# Patient Record
Sex: MALE | Race: ASIAN | NOT HISPANIC OR LATINO
[De-identification: names, ages, dates, MRNs, and addresses within clinical notes are randomized per-mention and may not be internally consistent; named-entity substitution may affect disease eponyms.]

---

## 2017-01-25 ENCOUNTER — APPOINTMENT (OUTPATIENT)
Dept: INTERNAL MEDICINE | Facility: CLINIC | Age: 47
End: 2017-01-25

## 2017-02-01 ENCOUNTER — CHART COPY (OUTPATIENT)
Age: 47
End: 2017-02-01

## 2017-04-18 ENCOUNTER — APPOINTMENT (OUTPATIENT)
Dept: INTERNAL MEDICINE | Facility: CLINIC | Age: 47
End: 2017-04-18

## 2017-04-18 VITALS
SYSTOLIC BLOOD PRESSURE: 120 MMHG | TEMPERATURE: 98.7 F | HEIGHT: 74 IN | WEIGHT: 184 LBS | DIASTOLIC BLOOD PRESSURE: 80 MMHG | BODY MASS INDEX: 23.61 KG/M2

## 2017-07-28 ENCOUNTER — LABORATORY RESULT (OUTPATIENT)
Age: 47
End: 2017-07-28

## 2017-07-28 ENCOUNTER — APPOINTMENT (OUTPATIENT)
Dept: INTERNAL MEDICINE | Facility: CLINIC | Age: 47
End: 2017-07-28
Payer: COMMERCIAL

## 2017-07-28 PROCEDURE — 43239 EGD BIOPSY SINGLE/MULTIPLE: CPT

## 2017-08-09 ENCOUNTER — LABORATORY RESULT (OUTPATIENT)
Age: 47
End: 2017-08-09

## 2017-08-09 ENCOUNTER — APPOINTMENT (OUTPATIENT)
Dept: INTERNAL MEDICINE | Facility: CLINIC | Age: 47
End: 2017-08-09
Payer: COMMERCIAL

## 2017-08-09 PROCEDURE — 36415 COLL VENOUS BLD VENIPUNCTURE: CPT

## 2017-08-10 LAB
ALBUMIN SERPL ELPH-MCNC: 4.8 G/DL
ALP BLD-CCNC: 83 U/L
ALT SERPL-CCNC: 41 U/L
ANION GAP SERPL CALC-SCNC: 15 MMOL/L
AST SERPL-CCNC: 18 U/L
BASOPHILS # BLD AUTO: 0.02 K/UL
BASOPHILS NFR BLD AUTO: 0.3 %
BILIRUB SERPL-MCNC: 0.8 MG/DL
BUN SERPL-MCNC: 13 MG/DL
CALCIUM SERPL-MCNC: 10.2 MG/DL
CHLORIDE SERPL-SCNC: 102 MMOL/L
CHOLEST SERPL-MCNC: 235 MG/DL
CHOLEST/HDLC SERPL: 5.9 RATIO
CO2 SERPL-SCNC: 24 MMOL/L
CREAT SERPL-MCNC: 1.12 MG/DL
EOSINOPHIL # BLD AUTO: 0.1 K/UL
EOSINOPHIL NFR BLD AUTO: 1.3 %
GLUCOSE SERPL-MCNC: 123 MG/DL
HBA1C MFR BLD HPLC: 5.6 %
HCT VFR BLD CALC: 47.7 %
HDLC SERPL-MCNC: 40 MG/DL
HGB BLD-MCNC: 16.4 G/DL
IMM GRANULOCYTES NFR BLD AUTO: 0.4 %
LDLC SERPL CALC-MCNC: 158 MG/DL
LDLC SERPL DIRECT ASSAY-MCNC: 169 MG/DL
LYMPHOCYTES # BLD AUTO: 2.48 K/UL
LYMPHOCYTES NFR BLD AUTO: 31.4 %
MAN DIFF?: NORMAL
MCHC RBC-ENTMCNC: 31.8 PG
MCHC RBC-ENTMCNC: 34.4 GM/DL
MCV RBC AUTO: 92.6 FL
MONOCYTES # BLD AUTO: 0.46 K/UL
MONOCYTES NFR BLD AUTO: 5.8 %
NEUTROPHILS # BLD AUTO: 4.8 K/UL
NEUTROPHILS NFR BLD AUTO: 60.8 %
PLATELET # BLD AUTO: 345 K/UL
POTASSIUM SERPL-SCNC: 4.5 MMOL/L
PROT SERPL-MCNC: 8.1 G/DL
PSA SERPL-MCNC: 0.45 NG/ML
RBC # BLD: 5.15 M/UL
RBC # FLD: 12.9 %
SAVE SPECIMEN: NORMAL
SODIUM SERPL-SCNC: 141 MMOL/L
T3 SERPL-MCNC: 122 NG/DL
T3RU NFR SERPL: 1 INDEX
T4 FREE SERPL-MCNC: 1.4 NG/DL
TRIGL SERPL-MCNC: 185 MG/DL
TSH SERPL-ACNC: 1.4 UIU/ML
WBC # FLD AUTO: 7.89 K/UL

## 2017-08-16 ENCOUNTER — APPOINTMENT (OUTPATIENT)
Dept: INTERNAL MEDICINE | Facility: CLINIC | Age: 47
End: 2017-08-16
Payer: COMMERCIAL

## 2017-08-16 VITALS
SYSTOLIC BLOOD PRESSURE: 106 MMHG | BODY MASS INDEX: 21.84 KG/M2 | DIASTOLIC BLOOD PRESSURE: 78 MMHG | HEIGHT: 74.5 IN | WEIGHT: 172 LBS

## 2017-08-16 DIAGNOSIS — Z00.00 ENCOUNTER FOR GENERAL ADULT MEDICAL EXAMINATION W/OUT ABNORMAL FINDINGS: ICD-10-CM

## 2017-08-16 DIAGNOSIS — R73.01 IMPAIRED FASTING GLUCOSE: ICD-10-CM

## 2017-08-16 PROCEDURE — 93000 ELECTROCARDIOGRAM COMPLETE: CPT

## 2017-08-16 PROCEDURE — 99396 PREV VISIT EST AGE 40-64: CPT | Mod: 25

## 2017-08-16 PROCEDURE — 94010 BREATHING CAPACITY TEST: CPT

## 2017-08-16 PROCEDURE — 99214 OFFICE O/P EST MOD 30 MIN: CPT | Mod: 25

## 2017-08-18 ENCOUNTER — FORM ENCOUNTER (OUTPATIENT)
Age: 47
End: 2017-08-18

## 2017-08-19 ENCOUNTER — APPOINTMENT (OUTPATIENT)
Dept: RADIOLOGY | Facility: HOSPITAL | Age: 47
End: 2017-08-19
Payer: COMMERCIAL

## 2017-08-19 ENCOUNTER — OUTPATIENT (OUTPATIENT)
Dept: OUTPATIENT SERVICES | Facility: HOSPITAL | Age: 47
LOS: 1 days | End: 2017-08-19
Payer: COMMERCIAL

## 2017-08-19 PROCEDURE — 71020: CPT | Mod: 26

## 2017-08-19 PROCEDURE — 71046 X-RAY EXAM CHEST 2 VIEWS: CPT

## 2017-11-27 ENCOUNTER — APPOINTMENT (OUTPATIENT)
Dept: INTERNAL MEDICINE | Facility: CLINIC | Age: 47
End: 2017-11-27
Payer: COMMERCIAL

## 2017-11-27 VITALS
WEIGHT: 175 LBS | HEIGHT: 74.5 IN | BODY MASS INDEX: 22.22 KG/M2 | TEMPERATURE: 98.7 F | SYSTOLIC BLOOD PRESSURE: 120 MMHG | DIASTOLIC BLOOD PRESSURE: 80 MMHG

## 2017-11-27 LAB — S PYO AG SPEC QL IA: NEGATIVE

## 2017-11-27 PROCEDURE — 87880 STREP A ASSAY W/OPTIC: CPT | Mod: QW

## 2017-11-27 PROCEDURE — 99214 OFFICE O/P EST MOD 30 MIN: CPT | Mod: 25

## 2017-11-29 LAB — BACTERIA THROAT CULT: NORMAL

## 2018-04-07 ENCOUNTER — INPATIENT (INPATIENT)
Facility: HOSPITAL | Age: 48
LOS: 2 days | Discharge: HOME | DRG: 390 | End: 2018-04-10
Attending: SURGERY | Admitting: SURGERY
Payer: COMMERCIAL

## 2018-04-07 DIAGNOSIS — K56.609 UNSPECIFIED INTESTINAL OBSTRUCTION, UNSPECIFIED AS TO PARTIAL VERSUS COMPLETE OBSTRUCTION: ICD-10-CM

## 2018-04-07 DIAGNOSIS — K56.50 INTESTINAL ADHESIONS [BANDS], UNSPECIFIED AS TO PARTIAL VERSUS COMPLETE OBSTRUCTION: ICD-10-CM

## 2018-04-07 PROCEDURE — 71045 X-RAY EXAM CHEST 1 VIEW: CPT | Mod: 26

## 2018-04-07 PROCEDURE — 74018 RADEX ABDOMEN 1 VIEW: CPT | Mod: 26

## 2018-04-08 DIAGNOSIS — K56.609 UNSPECIFIED INTESTINAL OBSTRUCTION, UNSPECIFIED AS TO PARTIAL VERSUS COMPLETE OBSTRUCTION: ICD-10-CM

## 2018-04-08 PROCEDURE — 71045 X-RAY EXAM CHEST 1 VIEW: CPT | Mod: 26

## 2018-04-08 PROCEDURE — 74177 CT ABD & PELVIS W/CONTRAST: CPT | Mod: 26

## 2018-04-08 PROCEDURE — 93010 ELECTROCARDIOGRAM REPORT: CPT

## 2018-04-08 PROCEDURE — 99223 1ST HOSP IP/OBS HIGH 75: CPT

## 2018-04-09 PROCEDURE — 99232 SBSQ HOSP IP/OBS MODERATE 35: CPT

## 2018-04-10 PROCEDURE — 80076 HEPATIC FUNCTION PANEL: CPT

## 2018-04-10 PROCEDURE — 83605 ASSAY OF LACTIC ACID: CPT

## 2018-04-10 PROCEDURE — 81003 URINALYSIS AUTO W/O SCOPE: CPT

## 2018-04-10 PROCEDURE — 85730 THROMBOPLASTIN TIME PARTIAL: CPT

## 2018-04-10 PROCEDURE — 71045 X-RAY EXAM CHEST 1 VIEW: CPT

## 2018-04-10 PROCEDURE — 86850 RBC ANTIBODY SCREEN: CPT

## 2018-04-10 PROCEDURE — 80048 BASIC METABOLIC PNL TOTAL CA: CPT

## 2018-04-10 PROCEDURE — 99285 EMERGENCY DEPT VISIT HI MDM: CPT | Mod: 25

## 2018-04-10 PROCEDURE — 99231 SBSQ HOSP IP/OBS SF/LOW 25: CPT

## 2018-04-10 PROCEDURE — 85025 COMPLETE CBC W/AUTO DIFF WBC: CPT

## 2018-04-10 PROCEDURE — 96374 THER/PROPH/DIAG INJ IV PUSH: CPT | Mod: XU

## 2018-04-10 PROCEDURE — 96376 TX/PRO/DX INJ SAME DRUG ADON: CPT

## 2018-04-10 PROCEDURE — 74018 RADEX ABDOMEN 1 VIEW: CPT

## 2018-04-10 PROCEDURE — 82150 ASSAY OF AMYLASE: CPT

## 2018-04-10 PROCEDURE — 80053 COMPREHEN METABOLIC PANEL: CPT

## 2018-04-10 PROCEDURE — 74177 CT ABD & PELVIS W/CONTRAST: CPT

## 2018-04-10 PROCEDURE — 85610 PROTHROMBIN TIME: CPT

## 2018-04-10 PROCEDURE — 86900 BLOOD TYPING SEROLOGIC ABO: CPT

## 2018-04-10 PROCEDURE — 96361 HYDRATE IV INFUSION ADD-ON: CPT

## 2018-04-10 PROCEDURE — 83690 ASSAY OF LIPASE: CPT

## 2018-04-10 PROCEDURE — 85027 COMPLETE CBC AUTOMATED: CPT

## 2018-04-10 PROCEDURE — 93005 ELECTROCARDIOGRAM TRACING: CPT

## 2018-04-10 PROCEDURE — 96375 TX/PRO/DX INJ NEW DRUG ADDON: CPT

## 2018-05-02 ENCOUNTER — APPOINTMENT (OUTPATIENT)
Dept: INTERNAL MEDICINE | Facility: CLINIC | Age: 48
End: 2018-05-02
Payer: COMMERCIAL

## 2018-05-02 VITALS
SYSTOLIC BLOOD PRESSURE: 100 MMHG | HEIGHT: 74.5 IN | BODY MASS INDEX: 22.22 KG/M2 | DIASTOLIC BLOOD PRESSURE: 80 MMHG | WEIGHT: 175 LBS

## 2018-05-02 DIAGNOSIS — E78.00 PURE HYPERCHOLESTEROLEMIA, UNSPECIFIED: ICD-10-CM

## 2018-05-02 DIAGNOSIS — R19.7 DIARRHEA, UNSPECIFIED: ICD-10-CM

## 2018-05-02 DIAGNOSIS — R10.9 UNSPECIFIED ABDOMINAL PAIN: ICD-10-CM

## 2018-05-02 DIAGNOSIS — R94.5 ABNORMAL RESULTS OF LIVER FUNCTION STUDIES: ICD-10-CM

## 2018-05-02 DIAGNOSIS — Z87.19 PERSONAL HISTORY OF OTHER DISEASES OF THE DIGESTIVE SYSTEM: ICD-10-CM

## 2018-05-02 PROCEDURE — 99214 OFFICE O/P EST MOD 30 MIN: CPT | Mod: 25

## 2018-05-02 PROCEDURE — 36415 COLL VENOUS BLD VENIPUNCTURE: CPT

## 2018-05-03 ENCOUNTER — LABORATORY RESULT (OUTPATIENT)
Age: 48
End: 2018-05-03

## 2018-05-04 LAB
ALBUMIN SERPL ELPH-MCNC: 5.1 G/DL
ALP BLD-CCNC: 77 U/L
ALT SERPL-CCNC: 68 U/L
ANION GAP SERPL CALC-SCNC: 16 MMOL/L
AST SERPL-CCNC: 26 U/L
BASOPHILS # BLD AUTO: 0.02 K/UL
BASOPHILS NFR BLD AUTO: 0.3 %
BILIRUB SERPL-MCNC: 0.7 MG/DL
BUN SERPL-MCNC: 13 MG/DL
CALCIUM SERPL-MCNC: 9.5 MG/DL
CHLORIDE SERPL-SCNC: 99 MMOL/L
CO2 SERPL-SCNC: 22 MMOL/L
CREAT SERPL-MCNC: 0.91 MG/DL
EOSINOPHIL # BLD AUTO: 0.16 K/UL
EOSINOPHIL NFR BLD AUTO: 2 %
GLUCOSE SERPL-MCNC: 96 MG/DL
HCT VFR BLD CALC: 46.8 %
HGB BLD-MCNC: 15.6 G/DL
IMM GRANULOCYTES NFR BLD AUTO: 0.1 %
LYMPHOCYTES # BLD AUTO: 2.71 K/UL
LYMPHOCYTES NFR BLD AUTO: 34.6 %
MAN DIFF?: NORMAL
MCHC RBC-ENTMCNC: 31.3 PG
MCHC RBC-ENTMCNC: 33.3 GM/DL
MCV RBC AUTO: 94 FL
MONOCYTES # BLD AUTO: 0.52 K/UL
MONOCYTES NFR BLD AUTO: 6.6 %
NEUTROPHILS # BLD AUTO: 4.42 K/UL
NEUTROPHILS NFR BLD AUTO: 56.4 %
PLATELET # BLD AUTO: 334 K/UL
POTASSIUM SERPL-SCNC: 4.4 MMOL/L
PROT SERPL-MCNC: 8.1 G/DL
RBC # BLD: 4.98 M/UL
RBC # FLD: 12.8 %
SODIUM SERPL-SCNC: 137 MMOL/L
WBC # FLD AUTO: 7.84 K/UL

## 2018-06-21 ENCOUNTER — APPOINTMENT (OUTPATIENT)
Dept: INTERNAL MEDICINE | Facility: CLINIC | Age: 48
End: 2018-06-21

## 2018-06-21 VITALS
DIASTOLIC BLOOD PRESSURE: 80 MMHG | BODY MASS INDEX: 23.09 KG/M2 | HEIGHT: 73.5 IN | WEIGHT: 178 LBS | TEMPERATURE: 98.3 F | SYSTOLIC BLOOD PRESSURE: 110 MMHG

## 2018-06-22 ENCOUNTER — TRANSCRIPTION ENCOUNTER (OUTPATIENT)
Age: 48
End: 2018-06-22

## 2018-06-23 ENCOUNTER — APPOINTMENT (OUTPATIENT)
Dept: INTERNAL MEDICINE | Facility: CLINIC | Age: 48
End: 2018-06-23

## 2018-07-14 ENCOUNTER — RESULT REVIEW (OUTPATIENT)
Age: 48
End: 2018-07-14

## 2018-08-17 ENCOUNTER — APPOINTMENT (OUTPATIENT)
Dept: INTERNAL MEDICINE | Facility: CLINIC | Age: 48
End: 2018-08-17

## 2018-08-24 ENCOUNTER — APPOINTMENT (OUTPATIENT)
Dept: INTERNAL MEDICINE | Facility: CLINIC | Age: 48
End: 2018-08-24

## 2018-10-28 ENCOUNTER — TRANSCRIPTION ENCOUNTER (OUTPATIENT)
Age: 48
End: 2018-10-28

## 2018-12-08 ENCOUNTER — APPOINTMENT (OUTPATIENT)
Dept: CT IMAGING | Facility: CLINIC | Age: 48
End: 2018-12-08

## 2019-04-25 ENCOUNTER — TRANSCRIPTION ENCOUNTER (OUTPATIENT)
Age: 49
End: 2019-04-25

## 2019-11-22 ENCOUNTER — TRANSCRIPTION ENCOUNTER (OUTPATIENT)
Age: 49
End: 2019-11-22

## 2020-09-05 ENCOUNTER — TRANSCRIPTION ENCOUNTER (OUTPATIENT)
Age: 50
End: 2020-09-05

## 2020-10-22 ENCOUNTER — EMERGENCY (EMERGENCY)
Facility: HOSPITAL | Age: 50
LOS: 1 days | Discharge: HOME | End: 2020-10-22
Attending: EMERGENCY MEDICINE | Admitting: EMERGENCY MEDICINE
Payer: COMMERCIAL

## 2020-10-22 VITALS
TEMPERATURE: 97 F | HEART RATE: 60 BPM | OXYGEN SATURATION: 97 % | DIASTOLIC BLOOD PRESSURE: 84 MMHG | RESPIRATION RATE: 18 BRPM | SYSTOLIC BLOOD PRESSURE: 145 MMHG

## 2020-10-22 VITALS
TEMPERATURE: 98 F | HEART RATE: 71 BPM | RESPIRATION RATE: 18 BRPM | OXYGEN SATURATION: 96 % | WEIGHT: 167.99 LBS | HEIGHT: 74 IN | DIASTOLIC BLOOD PRESSURE: 103 MMHG | SYSTOLIC BLOOD PRESSURE: 149 MMHG

## 2020-10-22 DIAGNOSIS — R51.9 HEADACHE, UNSPECIFIED: ICD-10-CM

## 2020-10-22 LAB
ALBUMIN SERPL ELPH-MCNC: 4.3 G/DL — SIGNIFICANT CHANGE UP (ref 3.3–5)
ALP SERPL-CCNC: 82 U/L — SIGNIFICANT CHANGE UP (ref 40–120)
ALT FLD-CCNC: 76 U/L — HIGH (ref 10–45)
ANION GAP SERPL CALC-SCNC: 10 MMOL/L — SIGNIFICANT CHANGE UP (ref 5–17)
AST SERPL-CCNC: 19 U/L — SIGNIFICANT CHANGE UP (ref 10–40)
BASOPHILS # BLD AUTO: 0.06 K/UL — SIGNIFICANT CHANGE UP (ref 0–0.2)
BASOPHILS NFR BLD AUTO: 0.6 % — SIGNIFICANT CHANGE UP (ref 0–2)
BILIRUB SERPL-MCNC: 1 MG/DL — SIGNIFICANT CHANGE UP (ref 0.2–1.2)
BUN SERPL-MCNC: 13 MG/DL — SIGNIFICANT CHANGE UP (ref 7–23)
CALCIUM SERPL-MCNC: 9 MG/DL — SIGNIFICANT CHANGE UP (ref 8.4–10.5)
CHLORIDE SERPL-SCNC: 104 MMOL/L — SIGNIFICANT CHANGE UP (ref 96–108)
CO2 SERPL-SCNC: 26 MMOL/L — SIGNIFICANT CHANGE UP (ref 22–31)
CREAT SERPL-MCNC: 1.04 MG/DL — SIGNIFICANT CHANGE UP (ref 0.5–1.3)
EOSINOPHIL # BLD AUTO: 0.06 K/UL — SIGNIFICANT CHANGE UP (ref 0–0.5)
EOSINOPHIL NFR BLD AUTO: 0.6 % — SIGNIFICANT CHANGE UP (ref 0–6)
GLUCOSE SERPL-MCNC: 96 MG/DL — SIGNIFICANT CHANGE UP (ref 70–99)
HCT VFR BLD CALC: 45.5 % — SIGNIFICANT CHANGE UP (ref 39–50)
HGB BLD-MCNC: 15.4 G/DL — SIGNIFICANT CHANGE UP (ref 13–17)
IMM GRANULOCYTES NFR BLD AUTO: 0.5 % — SIGNIFICANT CHANGE UP (ref 0–1.5)
LYMPHOCYTES # BLD AUTO: 28.7 % — SIGNIFICANT CHANGE UP (ref 13–44)
LYMPHOCYTES # BLD AUTO: 3.13 K/UL — SIGNIFICANT CHANGE UP (ref 1–3.3)
MCHC RBC-ENTMCNC: 31.1 PG — SIGNIFICANT CHANGE UP (ref 27–34)
MCHC RBC-ENTMCNC: 33.8 GM/DL — SIGNIFICANT CHANGE UP (ref 32–36)
MCV RBC AUTO: 91.9 FL — SIGNIFICANT CHANGE UP (ref 80–100)
MONOCYTES # BLD AUTO: 0.69 K/UL — SIGNIFICANT CHANGE UP (ref 0–0.9)
MONOCYTES NFR BLD AUTO: 6.3 % — SIGNIFICANT CHANGE UP (ref 2–14)
NEUTROPHILS # BLD AUTO: 6.9 K/UL — SIGNIFICANT CHANGE UP (ref 1.8–7.4)
NEUTROPHILS NFR BLD AUTO: 63.3 % — SIGNIFICANT CHANGE UP (ref 43–77)
NRBC # BLD: 0 /100 WBCS — SIGNIFICANT CHANGE UP (ref 0–0)
PLATELET # BLD AUTO: 316 K/UL — SIGNIFICANT CHANGE UP (ref 150–400)
POTASSIUM SERPL-MCNC: 3.8 MMOL/L — SIGNIFICANT CHANGE UP (ref 3.5–5.3)
POTASSIUM SERPL-SCNC: 3.8 MMOL/L — SIGNIFICANT CHANGE UP (ref 3.5–5.3)
PROT SERPL-MCNC: 7.9 G/DL — SIGNIFICANT CHANGE UP (ref 6–8.3)
RBC # BLD: 4.95 M/UL — SIGNIFICANT CHANGE UP (ref 4.2–5.8)
RBC # FLD: 12.1 % — SIGNIFICANT CHANGE UP (ref 10.3–14.5)
SARS-COV-2 RNA SPEC QL NAA+PROBE: SIGNIFICANT CHANGE UP
SODIUM SERPL-SCNC: 140 MMOL/L — SIGNIFICANT CHANGE UP (ref 135–145)
WBC # BLD: 10.89 K/UL — HIGH (ref 3.8–10.5)
WBC # FLD AUTO: 10.89 K/UL — HIGH (ref 3.8–10.5)

## 2020-10-22 PROCEDURE — 96361 HYDRATE IV INFUSION ADD-ON: CPT

## 2020-10-22 PROCEDURE — 36415 COLL VENOUS BLD VENIPUNCTURE: CPT

## 2020-10-22 PROCEDURE — 70450 CT HEAD/BRAIN W/O DYE: CPT | Mod: 26

## 2020-10-22 PROCEDURE — 96374 THER/PROPH/DIAG INJ IV PUSH: CPT

## 2020-10-22 PROCEDURE — 70450 CT HEAD/BRAIN W/O DYE: CPT

## 2020-10-22 PROCEDURE — 87635 SARS-COV-2 COVID-19 AMP PRB: CPT

## 2020-10-22 PROCEDURE — 80053 COMPREHEN METABOLIC PANEL: CPT

## 2020-10-22 PROCEDURE — 99285 EMERGENCY DEPT VISIT HI MDM: CPT

## 2020-10-22 PROCEDURE — 99284 EMERGENCY DEPT VISIT MOD MDM: CPT | Mod: 25

## 2020-10-22 PROCEDURE — 85025 COMPLETE CBC W/AUTO DIFF WBC: CPT

## 2020-10-22 RX ORDER — SODIUM CHLORIDE 9 MG/ML
1000 INJECTION INTRAMUSCULAR; INTRAVENOUS; SUBCUTANEOUS ONCE
Refills: 0 | Status: COMPLETED | OUTPATIENT
Start: 2020-10-22 | End: 2020-10-22

## 2020-10-22 RX ORDER — FLUTICASONE PROPIONATE 50 MCG
1 SPRAY, SUSPENSION NASAL
Qty: 1 | Refills: 0
Start: 2020-10-22 | End: 2020-11-20

## 2020-10-22 RX ORDER — METOCLOPRAMIDE HCL 10 MG
10 TABLET ORAL ONCE
Refills: 0 | Status: COMPLETED | OUTPATIENT
Start: 2020-10-22 | End: 2020-10-22

## 2020-10-22 RX ORDER — ACETAMINOPHEN 500 MG
650 TABLET ORAL ONCE
Refills: 0 | Status: COMPLETED | OUTPATIENT
Start: 2020-10-22 | End: 2020-10-22

## 2020-10-22 RX ADMIN — Medication 650 MILLIGRAM(S): at 19:12

## 2020-10-22 RX ADMIN — SODIUM CHLORIDE 1000 MILLILITER(S): 9 INJECTION INTRAMUSCULAR; INTRAVENOUS; SUBCUTANEOUS at 18:12

## 2020-10-22 RX ADMIN — Medication 650 MILLIGRAM(S): at 18:12

## 2020-10-22 RX ADMIN — SODIUM CHLORIDE 1000 MILLILITER(S): 9 INJECTION INTRAMUSCULAR; INTRAVENOUS; SUBCUTANEOUS at 19:12

## 2020-10-22 RX ADMIN — Medication 10 MILLIGRAM(S): at 18:10

## 2020-10-22 NOTE — ED PROVIDER NOTE - NSFOLLOWUPINSTRUCTIONS_ED_ALL_ED_FT
Sinusitis    WHAT YOU NEED TO KNOW:    Sinusitis is inflammation or infection of your sinuses. It is most often caused by a virus. Acute sinusitis may last up to 12 weeks. Chronic sinusitis lasts longer than 12 weeks. Recurrent sinusitis means you have 4 or more times in 1 year.     Sinuses         DISCHARGE INSTRUCTIONS:    Return to the emergency department if:   •Your eye and eyelid are red, swollen, and painful.       •You cannot open your eye.       •You have vision changes, such as double vision.      •Your eyeball bulges out or you cannot move your eye.       •You are more sleepy than normal, or you notice changes in your ability to think, move, or talk.      •You have a stiff neck, a fever, or a bad headache.       •You have swelling of your forehead or scalp.      Contact your healthcare provider if:   •Your symptoms do not improve after 3 days.      •Your symptoms do not go away after 10 days.      •You have nausea and are vomiting.      •Your nose is bleeding.      •You have questions or concerns about your condition or care.      Medicines: Your symptoms may go away on their own. Your healthcare provider may recommend watchful waiting for up to 10 days before starting antibiotics. You may need any of the following:   •Acetaminophen decreases pain and fever. It is available without a doctor's order. Ask how much to take and how often to take it. Follow directions. Read the labels of all other medicines you are using to see if they also contain acetaminophen, or ask your doctor or pharmacist. Acetaminophen can cause liver damage if not taken correctly. Do not use more than 4 grams (4,000 milligrams) total of acetaminophen in one day.       •NSAIDs, such as ibuprofen, help decrease swelling, pain, and fever. This medicine is available with or without a doctor's order. NSAIDs can cause stomach bleeding or kidney problems in certain people. If you take blood thinner medicine, always ask your healthcare provider if NSAIDs are safe for you. Always read the medicine label and follow directions.      •Nasal steroid sprays may help decrease inflammation in your nose and sinuses.      •Decongestants help reduce swelling and drain mucus in the nose and sinuses. They may help you breathe easier.       •Antihistamines help dry mucus in the nose and relieve sneezing.       •Antibiotics help treat or prevent a bacterial infection.      •Take your medicine as directed. Contact your healthcare provider if you think your medicine is not helping or if you have side effects. Tell him or her if you are allergic to any medicine. Keep a list of the medicines, vitamins, and herbs you take. Include the amounts, and when and why you take them. Bring the list or the pill bottles to follow-up visits. Carry your medicine list with you in case of an emergency.      Self-care:   •Rinse your sinuses. Use a sinus rinse device to rinse your nasal passages with a saline (salt water) solution or distilled water. Do not use tap water. This will help thin the mucus in your nose and rinse away pollen and dirt. It will also help reduce swelling so you can breathe normally. Ask your healthcare provider how often to do this.       •Breathe in steam. Heat a bowl of water until you see steam. Lean over the bowl and make a tent over your head with a large towel. Breathe deeply for about 20 minutes. Be careful not to get too close to the steam or burn yourself. Do this 3 times a day. You can also breathe deeply when you take a hot shower.       •Sleep with your head elevated. Place an extra pillow under your head before you go to sleep to help your sinuses drain.       •Drink liquids as directed. Ask your healthcare provider how much liquid to drink each day and which liquids are best for you. Liquids will thin the mucus in your nose and help it drain. Avoid drinks that contain alcohol or caffeine.       •Do not smoke, and avoid secondhand smoke. Nicotine and other chemicals in cigarettes and cigars can make your symptoms worse. Ask your healthcare provider for information if you currently smoke and need help to quit. E-cigarettes or smokeless tobacco still contain nicotine. Talk to your healthcare provider before you use these products.       Prevent the spread of germs that cause sinusitis: Wash your hands often with soap and water. Wash your hands after you use the bathroom, change a child's diaper, or sneeze. Wash your hands before you prepare or eat food.     Handwashing         Follow up with your healthcare provider as directed: You may be referred to an ear, nose, and throat specialist. Write down your questions so you remember to ask them during your visits.

## 2020-10-22 NOTE — ED PROVIDER NOTE - CARE PLAN
Principal Discharge DX:	Headache   Principal Discharge DX:	Headache  Secondary Diagnosis:	Sinusitis, unspecified chronicity, unspecified location

## 2020-10-22 NOTE — ED ADULT NURSE NOTE - OBJECTIVE STATEMENT
Pt presents to ED with c/o right sided h/a and pressure with right eye pressure x 2 days.  Denies any fall or trauma.  Denies any change in vision.  States no relief with cold medicine taken at home.  Denies any cough, chest pain.  Denies any fevers, chills, recent sick contacts.

## 2020-10-22 NOTE — ED PROVIDER NOTE - ATTENDING CONTRIBUTION TO CARE
Jonh with JOSEPH Quarles. 49 yr old male with no pmhx presents with right sided headache, and pressure behind eye for the last 2 days. + slight nasal congestion. No fever, chills, blurry vision, chest pain, sob, or any other symptoms. Pt requesting covid testing. No hx of headache. No recent covid contacts.    well appearing, clear lungs, RRR, neurologically intact, AOX3, VSS.  Labs/Ct neg. Pt with unilateral congestion, rhinorrhea, headache/pressure. Clinical sinusitis. Will tx w flonase, tylenol, augmentin. Worsening, continued or ANY new concerning symptoms return to the emergency department.  I performed a face to face bedside interview with patient regarding history of present illness, review of symptoms and past medical history. I completed an independent physical exam.  I have discussed the patient's plan of care with Physician Assistant (PA). I agree with note as stated above, having amended the EMR as needed to reflect my findings.   This includes History of Present Illness, HIV, Past Medical/Surgical/Family/Social History, Allergies and Home Medications, Review of Systems, Physical Exam, and any Progress Notes during the time I functioned as the attending physician for this patient.

## 2020-10-22 NOTE — ED PROVIDER NOTE - PATIENT PORTAL LINK FT
You can access the FollowMyHealth Patient Portal offered by [facility] by registering at the following website: [email or web address]. By joining [facility]’s FollowMyHealth portal, you will also be able to view your health information using other applications (apps) compatible with our system.

## 2020-10-22 NOTE — ED PROVIDER NOTE - OBJECTIVE STATEMENT
49 yr old male with no pmhx presents with 49 yr old male with no pmhx presents with constant right sided headache, and pressure behind eye for the last 2 days. + slight nasal congestion. No fever, chills, blurry vision, chest pain, sob, or any other symptoms. Pt requesting covid testing. No hx of headache. No recent covid contacts.

## 2020-10-22 NOTE — ED PROVIDER NOTE - TEMPLATE
Impression: Presbyopia: H52.4. Plan: A glasses Rx has been generated and dispensed but advised patient if he is considering cataract sx in within the next 6 month holding off on filled the new rx at this time. Pt to call with any concerns. General

## 2020-10-22 NOTE — ED PROVIDER NOTE - CLINICAL SUMMARY MEDICAL DECISION MAKING FREE TEXT BOX
49 yr old male with no pmhx presents with right sided headache, and pressure behind eye for the last 2 days. + slight nasal congestion. No fever, chills, blurry vision, chest pain, sob, or any other symptoms. Pt requesting covid testing. No hx of headache. No recent covid contacts.    well appearing, clear lungs, RRR, neurologically intact, AOX3, VSS. 49 yr old male with no pmhx presents with right sided headache, and pressure behind eye for the last 2 days. + slight nasal congestion. No fever, chills, blurry vision, chest pain, sob, or any other symptoms. Pt requesting covid testing. No hx of headache. No recent covid contacts.    well appearing, clear lungs, RRR, neurologically intact, AOX3, VSS.  Labs/Ct neg. Pt with unilateral congestion, rhinorrhea, headache/pressure. Clinical sinusitis. Will tx w flonase, tylenol, augmentin. Worsening, continued or ANY new concerning symptoms return to the emergency department.

## 2020-11-28 ENCOUNTER — TRANSCRIPTION ENCOUNTER (OUTPATIENT)
Age: 50
End: 2020-11-28

## 2020-11-29 ENCOUNTER — RESULT REVIEW (OUTPATIENT)
Age: 50
End: 2020-11-29

## 2020-11-29 ENCOUNTER — INPATIENT (INPATIENT)
Facility: HOSPITAL | Age: 50
LOS: 9 days | Discharge: HOME | DRG: 853 | End: 2020-12-09
Attending: SURGERY | Admitting: INTERNAL MEDICINE
Payer: COMMERCIAL

## 2020-11-29 VITALS
HEIGHT: 74 IN | DIASTOLIC BLOOD PRESSURE: 86 MMHG | SYSTOLIC BLOOD PRESSURE: 132 MMHG | WEIGHT: 177.91 LBS | TEMPERATURE: 98 F | RESPIRATION RATE: 18 BRPM | HEART RATE: 55 BPM | OXYGEN SATURATION: 97 %

## 2020-11-29 DIAGNOSIS — K56.609 UNSPECIFIED INTESTINAL OBSTRUCTION, UNSPECIFIED AS TO PARTIAL VERSUS COMPLETE OBSTRUCTION: Chronic | ICD-10-CM

## 2020-11-29 DIAGNOSIS — K56.609 UNSPECIFIED INTESTINAL OBSTRUCTION, UNSPECIFIED AS TO PARTIAL VERSUS COMPLETE OBSTRUCTION: ICD-10-CM

## 2020-11-29 DIAGNOSIS — Z98.890 OTHER SPECIFIED POSTPROCEDURAL STATES: Chronic | ICD-10-CM

## 2020-11-29 PROBLEM — Z78.9 OTHER SPECIFIED HEALTH STATUS: Chronic | Status: ACTIVE | Noted: 2020-10-22

## 2020-11-29 LAB
ALBUMIN SERPL ELPH-MCNC: 4.7 G/DL — SIGNIFICANT CHANGE UP (ref 3.3–5)
ALP SERPL-CCNC: 85 U/L — SIGNIFICANT CHANGE UP (ref 40–120)
ALT FLD-CCNC: 70 U/L — HIGH (ref 10–45)
ANION GAP SERPL CALC-SCNC: 19 MMOL/L — HIGH (ref 5–17)
ANION GAP SERPL CALC-SCNC: 5 MMOL/L — SIGNIFICANT CHANGE UP (ref 5–17)
APTT BLD: 28.2 SEC — SIGNIFICANT CHANGE UP (ref 27.5–35.5)
AST SERPL-CCNC: 21 U/L — SIGNIFICANT CHANGE UP (ref 10–40)
BASOPHILS # BLD AUTO: 0.04 K/UL — SIGNIFICANT CHANGE UP (ref 0–0.2)
BASOPHILS # BLD AUTO: 0.04 K/UL — SIGNIFICANT CHANGE UP (ref 0–0.2)
BASOPHILS NFR BLD AUTO: 0.2 % — SIGNIFICANT CHANGE UP (ref 0–2)
BASOPHILS NFR BLD AUTO: 0.4 % — SIGNIFICANT CHANGE UP (ref 0–2)
BILIRUB SERPL-MCNC: 1 MG/DL — SIGNIFICANT CHANGE UP (ref 0.2–1.2)
BLD GP AB SCN SERPL QL: SIGNIFICANT CHANGE UP
BUN SERPL-MCNC: 12 MG/DL — SIGNIFICANT CHANGE UP (ref 7–23)
BUN SERPL-MCNC: 15 MG/DL — SIGNIFICANT CHANGE UP (ref 7–23)
CALCIUM SERPL-MCNC: 9 MG/DL — SIGNIFICANT CHANGE UP (ref 8.4–10.5)
CALCIUM SERPL-MCNC: 9.5 MG/DL — SIGNIFICANT CHANGE UP (ref 8.4–10.5)
CHLORIDE SERPL-SCNC: 102 MMOL/L — SIGNIFICANT CHANGE UP (ref 96–108)
CHLORIDE SERPL-SCNC: 105 MMOL/L — SIGNIFICANT CHANGE UP (ref 96–108)
CO2 SERPL-SCNC: 17 MMOL/L — LOW (ref 22–31)
CO2 SERPL-SCNC: 31 MMOL/L — SIGNIFICANT CHANGE UP (ref 22–31)
CREAT SERPL-MCNC: 1.13 MG/DL — SIGNIFICANT CHANGE UP (ref 0.5–1.3)
CREAT SERPL-MCNC: 1.18 MG/DL — SIGNIFICANT CHANGE UP (ref 0.5–1.3)
EOSINOPHIL # BLD AUTO: 0.08 K/UL — SIGNIFICANT CHANGE UP (ref 0–0.5)
EOSINOPHIL # BLD AUTO: 0.1 K/UL — SIGNIFICANT CHANGE UP (ref 0–0.5)
EOSINOPHIL NFR BLD AUTO: 0.6 % — SIGNIFICANT CHANGE UP (ref 0–6)
EOSINOPHIL NFR BLD AUTO: 0.7 % — SIGNIFICANT CHANGE UP (ref 0–6)
GLUCOSE SERPL-MCNC: 122 MG/DL — HIGH (ref 70–99)
GLUCOSE SERPL-MCNC: 173 MG/DL — HIGH (ref 70–99)
HCT VFR BLD CALC: 46.1 % — SIGNIFICANT CHANGE UP (ref 39–50)
HCT VFR BLD CALC: 48 % — SIGNIFICANT CHANGE UP (ref 39–50)
HGB BLD-MCNC: 16.1 G/DL — SIGNIFICANT CHANGE UP (ref 13–17)
HGB BLD-MCNC: 16.2 G/DL — SIGNIFICANT CHANGE UP (ref 13–17)
IMM GRANULOCYTES NFR BLD AUTO: 0.6 % — SIGNIFICANT CHANGE UP (ref 0–1.5)
IMM GRANULOCYTES NFR BLD AUTO: 0.7 % — SIGNIFICANT CHANGE UP (ref 0–1.5)
INR BLD: 0.89 RATIO — SIGNIFICANT CHANGE UP (ref 0.88–1.16)
LACTATE SERPL-SCNC: 5.2 MMOL/L — CRITICAL HIGH (ref 0.7–2)
LIDOCAIN IGE QN: 117 U/L — SIGNIFICANT CHANGE UP (ref 73–393)
LYMPHOCYTES # BLD AUTO: 13.2 % — SIGNIFICANT CHANGE UP (ref 13–44)
LYMPHOCYTES # BLD AUTO: 2.28 K/UL — SIGNIFICANT CHANGE UP (ref 1–3.3)
LYMPHOCYTES # BLD AUTO: 2.49 K/UL — SIGNIFICANT CHANGE UP (ref 1–3.3)
LYMPHOCYTES # BLD AUTO: 22.9 % — SIGNIFICANT CHANGE UP (ref 13–44)
MAGNESIUM SERPL-MCNC: 2.1 MG/DL — SIGNIFICANT CHANGE UP (ref 1.6–2.6)
MCHC RBC-ENTMCNC: 31 PG — SIGNIFICANT CHANGE UP (ref 27–34)
MCHC RBC-ENTMCNC: 32.1 PG — SIGNIFICANT CHANGE UP (ref 27–34)
MCHC RBC-ENTMCNC: 33.8 GM/DL — SIGNIFICANT CHANGE UP (ref 32–36)
MCHC RBC-ENTMCNC: 34.9 GM/DL — SIGNIFICANT CHANGE UP (ref 32–36)
MCV RBC AUTO: 91.8 FL — SIGNIFICANT CHANGE UP (ref 80–100)
MCV RBC AUTO: 92 FL — SIGNIFICANT CHANGE UP (ref 80–100)
MONOCYTES # BLD AUTO: 0.6 K/UL — SIGNIFICANT CHANGE UP (ref 0–0.9)
MONOCYTES # BLD AUTO: 0.71 K/UL — SIGNIFICANT CHANGE UP (ref 0–0.9)
MONOCYTES NFR BLD AUTO: 4.1 % — SIGNIFICANT CHANGE UP (ref 2–14)
MONOCYTES NFR BLD AUTO: 5.5 % — SIGNIFICANT CHANGE UP (ref 2–14)
NEUTROPHILS # BLD AUTO: 14.03 K/UL — HIGH (ref 1.8–7.4)
NEUTROPHILS # BLD AUTO: 7.61 K/UL — HIGH (ref 1.8–7.4)
NEUTROPHILS NFR BLD AUTO: 69.9 % — SIGNIFICANT CHANGE UP (ref 43–77)
NEUTROPHILS NFR BLD AUTO: 81.2 % — HIGH (ref 43–77)
NRBC # BLD: 0 /100 WBCS — SIGNIFICANT CHANGE UP (ref 0–0)
NRBC # BLD: 0 /100 WBCS — SIGNIFICANT CHANGE UP (ref 0–0)
PLATELET # BLD AUTO: 329 K/UL — SIGNIFICANT CHANGE UP (ref 150–400)
PLATELET # BLD AUTO: 363 K/UL — SIGNIFICANT CHANGE UP (ref 150–400)
POTASSIUM SERPL-MCNC: 3.1 MMOL/L — LOW (ref 3.5–5.3)
POTASSIUM SERPL-MCNC: 3.7 MMOL/L — SIGNIFICANT CHANGE UP (ref 3.5–5.3)
POTASSIUM SERPL-SCNC: 3.1 MMOL/L — LOW (ref 3.5–5.3)
POTASSIUM SERPL-SCNC: 3.7 MMOL/L — SIGNIFICANT CHANGE UP (ref 3.5–5.3)
PROT SERPL-MCNC: 8.2 G/DL — SIGNIFICANT CHANGE UP (ref 6–8.3)
PROTHROM AB SERPL-ACNC: 10.8 SEC — SIGNIFICANT CHANGE UP (ref 10.6–13.6)
RBC # BLD: 5.01 M/UL — SIGNIFICANT CHANGE UP (ref 4.2–5.8)
RBC # BLD: 5.23 M/UL — SIGNIFICANT CHANGE UP (ref 4.2–5.8)
RBC # FLD: 12.2 % — SIGNIFICANT CHANGE UP (ref 10.3–14.5)
RBC # FLD: 12.4 % — SIGNIFICANT CHANGE UP (ref 10.3–14.5)
SARS-COV-2 IGG SERPL QL IA: NEGATIVE — SIGNIFICANT CHANGE UP
SARS-COV-2 IGM SERPL IA-ACNC: 0.08 INDEX — SIGNIFICANT CHANGE UP
SARS-COV-2 RNA SPEC QL NAA+PROBE: SIGNIFICANT CHANGE UP
SODIUM SERPL-SCNC: 138 MMOL/L — SIGNIFICANT CHANGE UP (ref 135–145)
SODIUM SERPL-SCNC: 141 MMOL/L — SIGNIFICANT CHANGE UP (ref 135–145)
WBC # BLD: 10.88 K/UL — HIGH (ref 3.8–10.5)
WBC # BLD: 17.28 K/UL — HIGH (ref 3.8–10.5)
WBC # FLD AUTO: 10.88 K/UL — HIGH (ref 3.8–10.5)
WBC # FLD AUTO: 17.28 K/UL — HIGH (ref 3.8–10.5)

## 2020-11-29 PROCEDURE — 93010 ELECTROCARDIOGRAM REPORT: CPT

## 2020-11-29 PROCEDURE — 44120 REMOVAL OF SMALL INTESTINE: CPT | Mod: AS

## 2020-11-29 PROCEDURE — 74177 CT ABD & PELVIS W/CONTRAST: CPT | Mod: 26

## 2020-11-29 PROCEDURE — 71045 X-RAY EXAM CHEST 1 VIEW: CPT | Mod: 26

## 2020-11-29 PROCEDURE — 44120 REMOVAL OF SMALL INTESTINE: CPT | Mod: 22

## 2020-11-29 PROCEDURE — 99285 EMERGENCY DEPT VISIT HI MDM: CPT

## 2020-11-29 PROCEDURE — 99223 1ST HOSP IP/OBS HIGH 75: CPT | Mod: 57

## 2020-11-29 PROCEDURE — 71045 X-RAY EXAM CHEST 1 VIEW: CPT | Mod: 26,77

## 2020-11-29 PROCEDURE — 88307 TISSUE EXAM BY PATHOLOGIST: CPT | Mod: 26

## 2020-11-29 PROCEDURE — 99233 SBSQ HOSP IP/OBS HIGH 50: CPT | Mod: 57

## 2020-11-29 PROCEDURE — 99223 1ST HOSP IP/OBS HIGH 75: CPT

## 2020-11-29 RX ORDER — MORPHINE SULFATE 50 MG/1
4 CAPSULE, EXTENDED RELEASE ORAL EVERY 8 HOURS
Refills: 0 | Status: DISCONTINUED | OUTPATIENT
Start: 2020-11-29 | End: 2020-11-29

## 2020-11-29 RX ORDER — SODIUM CHLORIDE 9 MG/ML
1000 INJECTION, SOLUTION INTRAVENOUS ONCE
Refills: 0 | Status: DISCONTINUED | OUTPATIENT
Start: 2020-11-29 | End: 2020-11-29

## 2020-11-29 RX ORDER — ONDANSETRON 8 MG/1
4 TABLET, FILM COATED ORAL ONCE
Refills: 0 | Status: COMPLETED | OUTPATIENT
Start: 2020-11-29 | End: 2020-11-29

## 2020-11-29 RX ORDER — SODIUM CHLORIDE 9 MG/ML
1000 INJECTION, SOLUTION INTRAVENOUS
Refills: 0 | Status: DISCONTINUED | OUTPATIENT
Start: 2020-11-29 | End: 2020-11-29

## 2020-11-29 RX ORDER — MORPHINE SULFATE 50 MG/1
2 CAPSULE, EXTENDED RELEASE ORAL EVERY 4 HOURS
Refills: 0 | Status: DISCONTINUED | OUTPATIENT
Start: 2020-11-29 | End: 2020-12-03

## 2020-11-29 RX ORDER — MORPHINE SULFATE 50 MG/1
4 CAPSULE, EXTENDED RELEASE ORAL ONCE
Refills: 0 | Status: DISCONTINUED | OUTPATIENT
Start: 2020-11-29 | End: 2020-11-29

## 2020-11-29 RX ORDER — PIPERACILLIN AND TAZOBACTAM 4; .5 G/20ML; G/20ML
3.38 INJECTION, POWDER, LYOPHILIZED, FOR SOLUTION INTRAVENOUS ONCE
Refills: 0 | Status: COMPLETED | OUTPATIENT
Start: 2020-11-29 | End: 2020-11-29

## 2020-11-29 RX ORDER — HYDROMORPHONE HYDROCHLORIDE 2 MG/ML
0.5 INJECTION INTRAMUSCULAR; INTRAVENOUS; SUBCUTANEOUS
Refills: 0 | Status: DISCONTINUED | OUTPATIENT
Start: 2020-11-29 | End: 2020-11-29

## 2020-11-29 RX ORDER — PANTOPRAZOLE SODIUM 20 MG/1
40 TABLET, DELAYED RELEASE ORAL DAILY
Refills: 0 | Status: DISCONTINUED | OUTPATIENT
Start: 2020-11-29 | End: 2020-11-30

## 2020-11-29 RX ORDER — METRONIDAZOLE 500 MG
500 TABLET ORAL EVERY 8 HOURS
Refills: 0 | Status: DISCONTINUED | OUTPATIENT
Start: 2020-11-29 | End: 2020-12-04

## 2020-11-29 RX ORDER — MORPHINE SULFATE 50 MG/1
2 CAPSULE, EXTENDED RELEASE ORAL EVERY 4 HOURS
Refills: 0 | Status: DISCONTINUED | OUTPATIENT
Start: 2020-11-29 | End: 2020-11-29

## 2020-11-29 RX ORDER — PANTOPRAZOLE SODIUM 20 MG/1
40 TABLET, DELAYED RELEASE ORAL DAILY
Refills: 0 | Status: DISCONTINUED | OUTPATIENT
Start: 2020-11-29 | End: 2020-11-29

## 2020-11-29 RX ORDER — ENOXAPARIN SODIUM 100 MG/ML
40 INJECTION SUBCUTANEOUS DAILY
Refills: 0 | Status: DISCONTINUED | OUTPATIENT
Start: 2020-11-30 | End: 2020-12-09

## 2020-11-29 RX ORDER — HYDROMORPHONE HYDROCHLORIDE 2 MG/ML
1 INJECTION INTRAMUSCULAR; INTRAVENOUS; SUBCUTANEOUS EVERY 6 HOURS
Refills: 0 | Status: DISCONTINUED | OUTPATIENT
Start: 2020-11-29 | End: 2020-11-30

## 2020-11-29 RX ORDER — POTASSIUM CHLORIDE 20 MEQ
10 PACKET (EA) ORAL
Refills: 0 | Status: COMPLETED | OUTPATIENT
Start: 2020-11-29 | End: 2020-11-29

## 2020-11-29 RX ORDER — ONDANSETRON 8 MG/1
4 TABLET, FILM COATED ORAL EVERY 6 HOURS
Refills: 0 | Status: DISCONTINUED | OUTPATIENT
Start: 2020-11-29 | End: 2020-12-04

## 2020-11-29 RX ORDER — INFLUENZA VIRUS VACCINE 15; 15; 15; 15 UG/.5ML; UG/.5ML; UG/.5ML; UG/.5ML
0.5 SUSPENSION INTRAMUSCULAR ONCE
Refills: 0 | Status: COMPLETED | OUTPATIENT
Start: 2020-11-29 | End: 2020-11-29

## 2020-11-29 RX ORDER — SODIUM CHLORIDE 9 MG/ML
1000 INJECTION INTRAMUSCULAR; INTRAVENOUS; SUBCUTANEOUS ONCE
Refills: 0 | Status: COMPLETED | OUTPATIENT
Start: 2020-11-29 | End: 2020-11-29

## 2020-11-29 RX ORDER — CIPROFLOXACIN LACTATE 400MG/40ML
400 VIAL (ML) INTRAVENOUS EVERY 12 HOURS
Refills: 0 | Status: DISCONTINUED | OUTPATIENT
Start: 2020-11-29 | End: 2020-12-04

## 2020-11-29 RX ORDER — PIPERACILLIN AND TAZOBACTAM 4; .5 G/20ML; G/20ML
3.38 INJECTION, POWDER, LYOPHILIZED, FOR SOLUTION INTRAVENOUS EVERY 8 HOURS
Refills: 0 | Status: DISCONTINUED | OUTPATIENT
Start: 2020-11-29 | End: 2020-11-29

## 2020-11-29 RX ORDER — HYDROMORPHONE HYDROCHLORIDE 2 MG/ML
1 INJECTION INTRAMUSCULAR; INTRAVENOUS; SUBCUTANEOUS ONCE
Refills: 0 | Status: DISCONTINUED | OUTPATIENT
Start: 2020-11-29 | End: 2020-11-29

## 2020-11-29 RX ORDER — SODIUM CHLORIDE 9 MG/ML
1000 INJECTION INTRAMUSCULAR; INTRAVENOUS; SUBCUTANEOUS
Refills: 0 | Status: DISCONTINUED | OUTPATIENT
Start: 2020-11-29 | End: 2020-11-29

## 2020-11-29 RX ORDER — MORPHINE SULFATE 50 MG/1
2 CAPSULE, EXTENDED RELEASE ORAL ONCE
Refills: 0 | Status: DISCONTINUED | OUTPATIENT
Start: 2020-11-29 | End: 2020-11-29

## 2020-11-29 RX ORDER — SODIUM CHLORIDE 9 MG/ML
1000 INJECTION, SOLUTION INTRAVENOUS
Refills: 0 | Status: DISCONTINUED | OUTPATIENT
Start: 2020-11-29 | End: 2020-12-08

## 2020-11-29 RX ORDER — MORPHINE SULFATE 50 MG/1
4 CAPSULE, EXTENDED RELEASE ORAL EVERY 4 HOURS
Refills: 0 | Status: DISCONTINUED | OUTPATIENT
Start: 2020-11-29 | End: 2020-12-03

## 2020-11-29 RX ORDER — CHLORHEXIDINE GLUCONATE 213 G/1000ML
1 SOLUTION TOPICAL ONCE
Refills: 0 | Status: COMPLETED | OUTPATIENT
Start: 2020-11-29 | End: 2020-11-29

## 2020-11-29 RX ORDER — ACETAMINOPHEN 500 MG
1000 TABLET ORAL ONCE
Refills: 0 | Status: COMPLETED | OUTPATIENT
Start: 2020-11-29 | End: 2020-11-29

## 2020-11-29 RX ORDER — SODIUM CHLORIDE 9 MG/ML
2500 INJECTION, SOLUTION INTRAVENOUS ONCE
Refills: 0 | Status: DISCONTINUED | OUTPATIENT
Start: 2020-11-29 | End: 2020-11-29

## 2020-11-29 RX ADMIN — Medication 200 MILLIGRAM(S): at 21:45

## 2020-11-29 RX ADMIN — Medication 400 MILLIGRAM(S): at 23:07

## 2020-11-29 RX ADMIN — MORPHINE SULFATE 4 MILLIGRAM(S): 50 CAPSULE, EXTENDED RELEASE ORAL at 03:40

## 2020-11-29 RX ADMIN — HYDROMORPHONE HYDROCHLORIDE 0.5 MILLIGRAM(S): 2 INJECTION INTRAMUSCULAR; INTRAVENOUS; SUBCUTANEOUS at 19:06

## 2020-11-29 RX ADMIN — HYDROMORPHONE HYDROCHLORIDE 1 MILLIGRAM(S): 2 INJECTION INTRAMUSCULAR; INTRAVENOUS; SUBCUTANEOUS at 05:15

## 2020-11-29 RX ADMIN — SODIUM CHLORIDE 1000 MILLILITER(S): 9 INJECTION INTRAMUSCULAR; INTRAVENOUS; SUBCUTANEOUS at 03:14

## 2020-11-29 RX ADMIN — ONDANSETRON 4 MILLIGRAM(S): 8 TABLET, FILM COATED ORAL at 05:15

## 2020-11-29 RX ADMIN — Medication 100 MILLIEQUIVALENT(S): at 22:45

## 2020-11-29 RX ADMIN — MORPHINE SULFATE 4 MILLIGRAM(S): 50 CAPSULE, EXTENDED RELEASE ORAL at 06:49

## 2020-11-29 RX ADMIN — MORPHINE SULFATE 2 MILLIGRAM(S): 50 CAPSULE, EXTENDED RELEASE ORAL at 22:00

## 2020-11-29 RX ADMIN — MORPHINE SULFATE 2 MILLIGRAM(S): 50 CAPSULE, EXTENDED RELEASE ORAL at 09:57

## 2020-11-29 RX ADMIN — MORPHINE SULFATE 4 MILLIGRAM(S): 50 CAPSULE, EXTENDED RELEASE ORAL at 03:25

## 2020-11-29 RX ADMIN — HYDROMORPHONE HYDROCHLORIDE 1 MILLIGRAM(S): 2 INJECTION INTRAMUSCULAR; INTRAVENOUS; SUBCUTANEOUS at 05:30

## 2020-11-29 RX ADMIN — Medication 1000 MILLIGRAM(S): at 23:22

## 2020-11-29 RX ADMIN — SODIUM CHLORIDE 125 MILLILITER(S): 9 INJECTION INTRAMUSCULAR; INTRAVENOUS; SUBCUTANEOUS at 06:52

## 2020-11-29 RX ADMIN — ONDANSETRON 4 MILLIGRAM(S): 8 TABLET, FILM COATED ORAL at 03:25

## 2020-11-29 RX ADMIN — MORPHINE SULFATE 2 MILLIGRAM(S): 50 CAPSULE, EXTENDED RELEASE ORAL at 21:44

## 2020-11-29 RX ADMIN — MORPHINE SULFATE 2 MILLIGRAM(S): 50 CAPSULE, EXTENDED RELEASE ORAL at 09:17

## 2020-11-29 RX ADMIN — MORPHINE SULFATE 4 MILLIGRAM(S): 50 CAPSULE, EXTENDED RELEASE ORAL at 08:07

## 2020-11-29 RX ADMIN — Medication 100 MILLIGRAM(S): at 23:08

## 2020-11-29 RX ADMIN — PIPERACILLIN AND TAZOBACTAM 200 GRAM(S): 4; .5 INJECTION, POWDER, LYOPHILIZED, FOR SOLUTION INTRAVENOUS at 05:58

## 2020-11-29 RX ADMIN — Medication 100 MILLIEQUIVALENT(S): at 21:45

## 2020-11-29 RX ADMIN — SODIUM CHLORIDE 1000 MILLILITER(S): 9 INJECTION INTRAMUSCULAR; INTRAVENOUS; SUBCUTANEOUS at 05:58

## 2020-11-29 NOTE — PROGRESS NOTE ADULT - ASSESSMENT
50M hx of abdominal surgery at 100 days ago (unclear what type), hx of SBO s/p ASHLEY 1991, hx of SBO in 4/2018 resolved with conservative tmt pw abd pain and recurrent SBO    #High grade SBO  #lactic acidosis likely due to ischemia/ hypoperfusion. less likely sepsis  #leukocytosis likely reactive  CT abd: high grade SBO.  IV antibxs  NPO  IVFs  NGT on continuous suction  spoke to surgery, will take to OR today for laparotomy   pt optimized for surgery  Analgesia    #DVT/GI proph

## 2020-11-29 NOTE — H&P ADULT - NSHPPHYSICALEXAM_GEN_ALL_CORE
Vital Signs Last 24 Hrs  T(C): 36.4 (29 Nov 2020 03:04), Max: 36.4 (29 Nov 2020 03:04)  T(F): 97.5 (29 Nov 2020 03:04), Max: 97.5 (29 Nov 2020 03:04)  HR: 84 (29 Nov 2020 03:25) (55 - 84)  BP: 130/80 (29 Nov 2020 03:25) (130/80 - 132/86)  BP(mean): --  RR: 20 (29 Nov 2020 03:25) (18 - 20)  SpO2: 99% (29 Nov 2020 03:25) (97% - 99%)  Daily Height in cm: 187.96 (29 Nov 2020 03:04)    Daily   CAPILLARY BLOOD GLUCOSE        I&O's Summary      GENERAL: NAD  HEAD:  Normocephalic  EYES: EOMI, PERRLA, conjunctiva and sclera clear  ENMT: No tonsillar erythema, exudates, or enlargement; Moist mucous membranes, No lesions  NECK: Supple, No JVD, no bruit, normal thyroid  NERVOUS SYSTEM:  Alert & Oriented X3, Good concentration; grossly  Motor Strength 5/5 B/L upper and lower extremities; DTRs 2+ intact and symmetric  CHEST/LUNG: Clear to auscultation bilaterally; No rales, rhonchi, wheezing, or rubs  HEART: Regular rate and rhythm; No murmurs, rubs, or gallops  ABDOMEN: Soft, mod distention, diffuse tenderness throughout, no francisco. dec Bowel sounds   EXTREMITIES:  2+ Peripheral Pulses, No clubbing, cyanosis, or edema  LYMPH: No lymphadenopathy noted  SKIN: No rashes or lesions

## 2020-11-29 NOTE — PROGRESS NOTE ADULT - SUBJECTIVE AND OBJECTIVE BOX
Patient is a 50y old  Male who presents with a chief complaint of abd pain, SBO (29 Nov 2020 05:31)      Patient seen and examined at bedside. pt reports severe diffuse abd pain, most on right upper quadrant     ALLERGIES:  No Known Allergies    MEDICATIONS  (STANDING):  lactated ringers 1000 milliLiter(s) (125 mL/Hr) IV Continuous <Continuous>  pantoprazole  Injectable 40 milliGRAM(s) IV Push daily  piperacillin/tazobactam IVPB.. 3.375 Gram(s) IV Intermittent every 8 hours  potassium chloride  10 mEq/100 mL IVPB 10 milliEquivalent(s) IV Intermittent every 1 hour    MEDICATIONS  (PRN):  morphine  - Injectable 2 milliGRAM(s) IV Push every 4 hours PRN Moderate Pain (4 - 6)  morphine  - Injectable 4 milliGRAM(s) IV Push every 8 hours PRN Severe Pain (7 - 10)    Vital Signs Last 24 Hrs  T(F): 98.2 (29 Nov 2020 06:07), Max: 98.2 (29 Nov 2020 06:07)  HR: 55 (29 Nov 2020 06:07) (52 - 84)  BP: 138/82 (29 Nov 2020 06:07) (97/56 - 149/82)  RR: 18 (29 Nov 2020 06:07) (18 - 20)  SpO2: 100% (29 Nov 2020 06:07) (97% - 100%)  I&O's Summary    PHYSICAL EXAM:  General: NAD, A/O x 3  ENT: MMM  Neck: Supple, No JVD  Lungs: Clear to auscultation bilaterally  Cardio: RRR, S1/S2, No murmurs  Abdomen: Soft, Nontender, Nondistended; Bowel sounds present  Extremities: No calf tenderness, No pitting edema    LABS:                        16.2   17.28 )-----------( 363      ( 29 Nov 2020 09:00 )             48.0     11-29    138  |  102  |  12  ----------------------------<  173  3.1   |  17  |  1.13    Ca    9.0      29 Nov 2020 09:00  Mg     2.1     11-29    TPro  8.2  /  Alb  4.7  /  TBili  1.0  /  DBili  x   /  AST  21  /  ALT  70  /  AlkPhos  85  11-29    eGFR if Non African American: 76 mL/min/1.73M2 (11-29-20 @ 09:00)  eGFR if : 88 mL/min/1.73M2 (11-29-20 @ 09:00)    PT/INR - ( 29 Nov 2020 09:00 )   PT: 10.8 sec;   INR: 0.89 ratio         PTT - ( 29 Nov 2020 09:00 )  PTT:28.2 sec  Lactate, Blood: 5.2 mmol/L (11-29 @ 10:15)                                RADIOLOGY & ADDITIONAL TESTS:    Care Discussed with Consultants/Other Providers:    Patient is a 50y old  Male who presents with a chief complaint of abd pain, SBO (29 Nov 2020 05:31)      Patient seen and examined at bedside. pt reports severe diffuse abd pain, most on right lower quadrant. + nausea/vomiting. no fever, chills, sob, cp.    ALLERGIES:  No Known Allergies    MEDICATIONS  (STANDING):  lactated ringers 1000 milliLiter(s) (125 mL/Hr) IV Continuous <Continuous>  pantoprazole  Injectable 40 milliGRAM(s) IV Push daily  piperacillin/tazobactam IVPB.. 3.375 Gram(s) IV Intermittent every 8 hours  potassium chloride  10 mEq/100 mL IVPB 10 milliEquivalent(s) IV Intermittent every 1 hour    MEDICATIONS  (PRN):  morphine  - Injectable 2 milliGRAM(s) IV Push every 4 hours PRN Moderate Pain (4 - 6)  morphine  - Injectable 4 milliGRAM(s) IV Push every 8 hours PRN Severe Pain (7 - 10)    Vital Signs Last 24 Hrs  T(F): 98.2 (29 Nov 2020 06:07), Max: 98.2 (29 Nov 2020 06:07)  HR: 55 (29 Nov 2020 06:07) (52 - 84)  BP: 138/82 (29 Nov 2020 06:07) (97/56 - 149/82)  RR: 18 (29 Nov 2020 06:07) (18 - 20)  SpO2: 100% (29 Nov 2020 06:07) (97% - 100%)  I&O's Summary    PHYSICAL EXAM:  General: NAD, A/O x 3. in pain, appears uncomfortable  ENT: MMM  Neck: Supple, No JVD  Lungs: Clear to auscultation bilaterally  Cardio: RRR, S1/S2, No murmurs  Abdomen: Soft, most tender around RLQ. non distended. +guarding and rebound tenderness. + BS  Extremities: No calf tenderness, No pitting edema    LABS:                        16.2   17.28 )-----------( 363      ( 29 Nov 2020 09:00 )             48.0     11-29    138  |  102  |  12  ----------------------------<  173  3.1   |  17  |  1.13    Ca    9.0      29 Nov 2020 09:00  Mg     2.1     11-29    TPro  8.2  /  Alb  4.7  /  TBili  1.0  /  DBili  x   /  AST  21  /  ALT  70  /  AlkPhos  85  11-29    eGFR if Non African American: 76 mL/min/1.73M2 (11-29-20 @ 09:00)  eGFR if : 88 mL/min/1.73M2 (11-29-20 @ 09:00)    PT/INR - ( 29 Nov 2020 09:00 )   PT: 10.8 sec;   INR: 0.89 ratio         PTT - ( 29 Nov 2020 09:00 )  PTT:28.2 sec  Lactate, Blood: 5.2 mmol/L (11-29 @ 10:15)              RADIOLOGY & ADDITIONAL TESTS:      < from: CT Abdomen and Pelvis w/ IV Cont (11.29.20 @ 04:13) >  IMPRESSION:  High-grade small bowel obstruction with single transition point in the ileum, and a similar configuration to prior bowel obstruction 04/08/2018.  No evidence of gastrointestinal perforation, abscess/drainable fluid collection or secondary CT signs of bowel ischemia.  The patient would likely benefit from nasogastric tube placement.  I discussed the findings with Dr. Drew on 11/29/2020 at approximately 4:35 AM.    Mild fluid distention of the distal esophagus, compatible with gastroesophageal reflux.    < end of copied text >  Care Discussed with Consultants/Other Providers: spoke to Dr. Chahal

## 2020-11-29 NOTE — H&P ADULT - NSHPREVIEWOFSYSTEMS_GEN_ALL_CORE
CONSTITUTIONAL: No fever, weight loss, or fatigue  EYES: No eye pain, visual disturbances, or discharge  ENMT:  No difficulty hearing, tinnitus, vertigo; No sinus or throat pain  NECK: No pain or stiffness  RESPIRATORY: No cough, wheezing, chills or hemoptysis; No shortness of breath  CARDIOVASCULAR: No chest pain, palpitations, dizziness, or leg swelling  GASTROINTESTINAL: + abdominal  pain. + nausea, vomiting, no hematemesis; No diarrhea or constipation. No melena or hematochezia.  GENITOURINARY: No dysuria, frequency, hematuria, or incontinence  NEUROLOGICAL: No headaches, memory loss, loss of strength, numbness, or tremors  SKIN: No itching, burning, rashes, or lesions   LYMPH NODES: No enlarged glands  ENDOCRINE: No heat or cold intolerance; No hair loss  MUSCULOSKELETAL: No joint pain or swelling; No muscle, back, or extremity pain  PSYCHIATRIC: No depression, anxiety, mood swings, or difficulty sleeping  HEME/LYMPH: No easy bruising, or bleeding gums  ALLERY AND IMMUNOLOGIC: No hives or eczema    IMPROVE VTE Individual Risk Assessment          RISK                                                          Points  [  ] Previous VTE                                                3  [  ] Thrombophilia                                             2  [  ] Lower limb paralysis                                   2        (unable to hold up >15 seconds)    [  ] Current Cancer                                             2         (within 6 months)  [  ] Immobilization > 24 hrs                              1  [  ] ICU/CCU stay > 24 hours                             1  [  ] Age > 60                                                         1    IMPROVE VTE Score:         [   0      ]    Total Risk Factor Score:    0 - 1:   Consider IPC  >2 - 3:  Thromboprophylaxis required (enoxaparin or SQ heparin)        >4:   High Risk: Thromboprophylaxis required (enoxaparin or SQ heparin), optional add IPC  **If CONTRAINDICATION to enoxaparin or SQ heparin, USE IPCs**

## 2020-11-29 NOTE — CONSULT NOTE ADULT - SUBJECTIVE AND OBJECTIVE BOX
This 50 year old man developed generalized abdominal pain, nausea and vomiting two days ago. He presented to the ED early this morning where a CT scan demonstrated "high grade SBO". The patient underwent a laparotomy twenty years ago for a SBO and was admitted six months ago for nonoperative treatment of SBO. Presently, the patient complains of "severe", generalized abdominal pain. He is afebrile, but the WBC (17.28) and lactate (5.2) levels are significantly elevated.      PAST MEDICAL & SURGICAL HISTORY:  No pertinent past medical history.  2001 - laparotomy for SBO    PFSH: All noncontributory    MEDICATIONS REVIEWED:lactated ringers Bolus 1000 milliLiter(s) IV Bolus once  morphine  - Injectable 2 milliGRAM(s) IV Push every 4 hours PRN  morphine  - Injectable 4 milliGRAM(s) IV Push every 8 hours PRN  pantoprazole  Injectable 40 milliGRAM(s) IV Push daily  piperacillin/tazobactam IVPB.. 3.375 Gram(s) IV Intermittent every 8 hours  potassium chloride  10 mEq/100 mL IVPB 10 milliEquivalent(s) IV Intermittent every 1 hour      ALLERGIES:No Known Allergies      REVIEW OF SYSTEMS:  Comprehensive Review of Systems negative except as noted in HPI      PHYSICAL EXAMINATION:  RESPIRATORY: Clear to auscultation bilaterally, respirations non-labored.  CARDIAC: RR, normal S1S2, no murmurs.  ABDOMEN: Tender to light palpation throughout abdomen, BS present, no masses, no hernias, no KLS  VASCULAR: Equal and normal pulses.  MUSCULOSKELETAL: Full ROM, no deformities.      T(C): 36.8 (11-29-20 @ 06:07), Max: 36.8 (11-29-20 @ 06:07)  HR: 55 (11-29-20 @ 06:07) (52 - 84)  BP: 138/82 (11-29-20 @ 06:07) (97/56 - 149/82)  RR: 18 (11-29-20 @ 06:07) (18 - 20)  SpO2: 100% (11-29-20 @ 06:07) (97% - 100%)                          16.2   17.28 )-----------( 363      ( 29 Nov 2020 09:00 )             48.0       11-29    138  |  102  |  12  ----------------------------<  173<H>  3.1<L>   |  17<L>  |  1.13    Ca    9.0      29 Nov 2020 09:00  Mg     2.1     11-29    TPro  8.2  /  Alb  4.7  /  TBili  1.0  /  DBili  x   /  AST  21  /  ALT  70<H>  /  AlkPhos  85  11-29       This 50 year old man developed generalized abdominal pain, nausea and vomiting early yesterday evening (20 hrs ago). He presented to the ED early this morning where a CT scan demonstrated "high grade SBO". The patient underwent a laparotomy in 1991 for a SBO and was admitted two years ago for nonoperative treatment of SBO. Presently, he  patient complains of "severe", generalized abdominal pain. The patient is afebrile, but the WBC (17.28) and lactate (5.2) levels are significantly elevated.      PAST MEDICAL & SURGICAL HISTORY:  No pertinent past medical history.  1991 - laparotomy for SBO    PFSH: All noncontributory    MEDICATIONS REVIEWED:lactated ringers Bolus 1000 milliLiter(s) IV Bolus once  morphine  - Injectable 2 milliGRAM(s) IV Push every 4 hours PRN  morphine  - Injectable 4 milliGRAM(s) IV Push every 8 hours PRN  pantoprazole  Injectable 40 milliGRAM(s) IV Push daily  piperacillin/tazobactam IVPB.. 3.375 Gram(s) IV Intermittent every 8 hours  potassium chloride  10 mEq/100 mL IVPB 10 milliEquivalent(s) IV Intermittent every 1 hour      ALLERGIES:No Known Allergies      REVIEW OF SYSTEMS:  Comprehensive Review of Systems negative except as noted in HPI      PHYSICAL EXAMINATION:  RESPIRATORY: Clear to auscultation bilaterally, respirations non-labored.  CARDIAC: RR, normal S1S2, no murmurs.  ABDOMEN: Tender to light palpation throughout abdomen, BS present, no masses, no hernias, no KLS  VASCULAR: Equal and normal pulses.  MUSCULOSKELETAL: Full ROM, no deformities.      T(C): 36.8 (11-29-20 @ 06:07), Max: 36.8 (11-29-20 @ 06:07)  HR: 55 (11-29-20 @ 06:07) (52 - 84)  BP: 138/82 (11-29-20 @ 06:07) (97/56 - 149/82)  RR: 18 (11-29-20 @ 06:07) (18 - 20)  SpO2: 100% (11-29-20 @ 06:07) (97% - 100%)                          16.2   17.28 )-----------( 363      ( 29 Nov 2020 09:00 )             48.0       11-29    138  |  102  |  12  ----------------------------<  173<H>  3.1<L>   |  17<L>  |  1.13    Ca    9.0      29 Nov 2020 09:00  Mg     2.1     11-29    TPro  8.2  /  Alb  4.7  /  TBili  1.0  /  DBili  x   /  AST  21  /  ALT  70<H>  /  AlkPhos  85  11-29

## 2020-11-29 NOTE — BRIEF OPERATIVE NOTE - NSICDXBRIEFPROCEDURE_GEN_ALL_CORE_FT
PROCEDURES:  Exploratory laparotomy for bowel obstruction 29-Nov-2020 19:15:10 with small bowel resection Guadalupe Au

## 2020-11-29 NOTE — H&P ADULT - HISTORY OF PRESENT ILLNESS
50M hx of abdominal surgery at 100 days ago (unclear what type), hx of SBO s/p ASHLEY 1991, hx of SBO in 4/2018 resolved with conservative tmt pw abd pain. Abd pain started moderately around 5PM, was able to tolerate dinner, had self treated with PeptoBismol. Woke up around 1200 with worsening sxs and vomiting x 1,  self treated with Faby [place] with no benefit and came to ED. In ED, afebrile hemodynamically stable. WBC:10.9 AST:70. CTAP with high grade SBO.

## 2020-11-29 NOTE — ED ADULT NURSE NOTE - CHIEF COMPLAINT QUOTE
Pt c/o abdominal pain that began at 5pm yesterday, took Pepto-Bismol and willian-seltzer PTA with no relief.

## 2020-11-29 NOTE — PATIENT PROFILE ADULT - NSASFALLNEEDSASSIST_GEN_A_NUR
Cardiovascular/Thoracic Surgery Consultation: 9/27/2017    Primary medical doctor: Cecy    Cardiologist: Jax    Requesting Physician: Jax    Reason for consultation: aortic valve disease    Chief complaint: fatigue    History present of illness:  Patient is a very pleasant 87yo female who presents to the [facility] for further evaluation of her aortic valve disease.  Patient states that she hasn't really noticed any change in the last year.  She has had a rough year with the stroke, and most recently she has been very tired - but she blames this on the weather aggravating her RA.  She does get tired, but she says she blames it all on her arthritis. She can walk with a walker, but if it is a long distance she needs to use a wheelchair.      Past Medical History    Aortic valve stenosis, severe                                 CVA (cerebral vascular accident) (CMS/[facility])      07/2016       DJD (degenerative joint disease)                              Anemia                                                        Rheumatoid arthritis                                          HTN (hypertension)                                            Hyperlipidemia                                                Past Surgical History    TOTAL KNEE ARTHROPLASTY                         2001          ABDOMEN SURGERY                                               HYSTERECTOMY                                                  HERNIA REPAIR                                                 ALLERGIES:   Allergen Reactions   • Sulfa Antibiotics Other (See Comments)         Current Outpatient Prescriptions   Medication Sig   • aspirin 81 MG tablet Take 81 mg by mouth daily.   • RESTASIS 0.05 % ophthalmic emulsion 1 drop in both eyes twice a day   • REFRESH TEARS 0.5 % ophthalmic solution 1 drop in both eyes three times a day   • potassium chloride 10 MEQ CR tablet Take 1 tablet by mouth daily.   • furosemide (LASIX) 20 MG tablet Take 1  tablet (20 mg total) by mouth daily.   • ondansetron (ZOFRAN) 4 MG tablet Take 1 tablet (4 mg total) by mouth daily as needed for Nausea.   • sucralfate (CARAFATE) 1 GM/10ML suspension Take 10 mLs (1 g total) by mouth 3 (three) times a day.   • lisinopril (ZESTRIL) 5 MG tablet Take 1 tablet by mouth daily.   • potassium chloride 10 MEQ CR tablet Take one tablet by mouth daily   • pantoprazole (PROTONIX) 40 MG tablet Take 1 tablet (40 mg total) by mouth 2 (two) times daily Indications: GASTROESOPHAGEAL REFLUX DISEASE.   • rosuvastatin (CRESTOR) 5 MG tablet Take 1 tablet  by mouth daily.   • sertraline (ZOLOFT) 25 MG tablet Take 1 tablet by mouth daily.   • sucralfate (CARAFATE) 1 GM/10ML suspension TAKE 10 MLS BY MOUTH THREE TIMES DAILY   • atorvastatin (LIPITOR) 20 MG tablet Take 1 tablet by mouth daily.   • clindamycin (CLEOCIN) 300 MG capsule Take 1 capsule by mouth 3 times daily.     No current facility-administered medications for this visit.        History   Smoking Status   • Never Smoker   Smokeless Tobacco   • Never Used       History   Alcohol use Not on file       History   Drug use: Unknown       Occupation: Retired  Living situation: Home    No family history on file.    Review of Systems:    A 10point ROS was gone over and was negative except as reviewed above in the HPI    Physical Exam:    Visit Vitals  /62   Pulse 50   Ht 4' 6\" (1.372 m)   Wt 42 kg   BMI 22.33 kg/m²     Ht Readings from Last 1 Encounters:   09/27/17 4' 6.5\" (1.384 m)     Wt Readings from Last 1 Encounters:   09/27/17 42.2 kg     Body mass index is 22.33 kg/m².    Physical Exam:    GENERAL: Well developed, well nourished, in no acute distress.  HEENT: Head normocephalic, atraumatic, sclerae anicteric.  NECK: Negative for JVD (jugular venous distension). Trachea midline.  CHEST: Non-labored breathing.  CARDIOVASCULAR: No edema.  INTEGUMENTARY: Skin warm and dry.  MUSCULOSKELETAL: Patient station normal. No cyanosis or clubbing.  Lower extremities are normal to inspection.   NEUROLOGIC/PSYCHIATRIC: Alert and oriented x 3. Mood and affect normal.       Labs:  Reviewed    Diagnostics:  ECHO: Reviewed    STS: 4.851%  NYHA: III    5 meter Walk:  Wheelchair, unable without her walker    Handgrip:  Unable due to RA    Impression/Plan:  Moderate Aortic Stenosis - CXR to eval pleural effusion.  Recommend follow up with [facility] Clinic in 6 months with ECHO.    History and Physical Exam performed by Advanced Practice Provider. Impression and Plan formulated by Physician and scribed by Advanced Practice Provider.    I, Bailey Fry PA-C, attest that I performed the duties of a scribe for this encounter, in the presence of Dr. Ponce.    I attest that I saw and examined the patient and agree with the note as scribed. Jake Ponce M.D.  I attest that I saw and examined the patient and agree with the note as scribed. Jake Ponce M.D.    CC: Patricia   no

## 2020-11-29 NOTE — H&P ADULT - ASSESSMENT
50M hx of abdominal surgery at 100 days ago (unclear what type), hx of SBO s/p ASHLEY 1991, hx of SBO in 4/2018 resolved with conservative tmt pw abd pain and recurrent SBO    #High grade SBO  IV antibxs  NPO  IVFs  NGT  Analgesia  Surgery Dr Cordero aware.     #DVT/GI proph

## 2020-11-29 NOTE — ED ADULT NURSE NOTE - NSIMPLEMENTINTERV_GEN_ALL_ED
Implemented All Universal Safety Interventions:  [place] to call system. Call bell, personal items and telephone within reach. Instruct patient to call for assistance. Room bathroom lighting operational. Non-slip footwear when patient is off stretcher. Physically safe environment: no spills, clutter or unnecessary equipment. Stretcher in lowest position, wheels locked, appropriate side rails in place.

## 2020-11-29 NOTE — H&P ADULT - NSICDXPASTSURGICALHX_GEN_ALL_CORE_FT
PAST SURGICAL HISTORY:  H/O abdominal surgery at age 89 days    SBO (small bowel obstruction) s/p sx 1991

## 2020-11-29 NOTE — ED ADULT NURSE NOTE - OBJECTIVE STATEMENT
Pt alert and oriented X3, c/o 10/10 abdominal pain that began at 17:00 on 11/28/20. Patient reports feeling ill, trying to eat a meal at 18:00, but after meal abdominal pain worsened. Took pepto bismol at 20:00 and willian seltzer at 22:00 with no relief. Patient reports having an episode of vomiting at approx 22:00, and a small bowel movement as well. Patient has a history of bowel obstruction with surgery in 1991 and another episode 2 years ago. Patient denies recent illness, diarrhea, fever, chills, cough, nasal drip, SOB, chest pain, back pain, radiation of pain.

## 2020-11-29 NOTE — H&P ADULT - NSHPLABSRESULTS_GEN_ALL_CORE
16.1   10.88 )-----------( 329      ( 29 Nov 2020 03:10 )             46.1       11-29    141  |  105  |  15  ----------------------------<  122<H>  3.7   |  31  |  1.18    Ca    9.5      29 Nov 2020 03:10    TPro  8.2  /  Alb  4.7  /  TBili  1.0  /  DBili  x   /  AST  21  /  ALT  70<H>  /  AlkPhos  85  11-29         LIVER FUNCTIONS - ( 29 Nov 2020 03:10 )  Alb: 4.7 g/dL / Pro: 8.2 g/dL / ALK PHOS: 85 U/L / ALT: 70 U/L / AST: 21 U/L / GGT: x             Lipase, Serum: 117 U/L (11-29-20 @ 03:10)        EKG: sinus rosie 52bpm, no acute ST changes.       CXR: wet read NAPD    d< from: CT Abdomen and Pelvis w/ IV Cont (11.29.20 @ 04:13) >    FINDINGS:  LOWER CHEST: Within normal limits.    LIVER: Scattered subcentimeter hypoattenuating foci in the liver are too small to accurately characterize by CT scan.  BILE DUCTS: Normal caliber.  GALLBLADDER: Within normal limits.  SPLEEN: Within normal limits.  PANCREAS: Within normal limits.  ADRENALS: Within normal limits.  KIDNEYS/URETERS: Symmetric renal enhancement.  Unchanged left hydronephrosis without hydroureter or obstructing renal stones.  Nonobstructing there are nonobstructing renal stone measuring 4 mm in the left mid pole and 5 mm in the left lower pole.  There is focal renal cortical scarring in the left midpole.  There is stable fullness in the right renal collecting system.  No right-sided renal stones.    BLADDER: Within normal limits.  REPRODUCTIVE ORGANS: The prostate measures approximately 2.6 x 4.1 x 4.2 cm.    BOWEL: Mild fluid distention ofthe distal esophagus.  There is a recurrent high-grade small bowel obstruction with single transition point at the level of a fecalized loop of ileum, located in the upper pelvis (2:96 and 602:40-42), similar in configuration to the previous bowel obstruction on 04/08/2018.  There is mild associated mesenteric edema and trace interloop ascites.  No bowel wall thickening or pneumatosis.  PERITONEUM: No free air or abscess.  VESSELS: Mild atherosclerotic calcification of the aortoiliac tree.  RETROPERITONEUM/LYMPH NODES: No lymphadenopathy.  ABDOMINAL WALL: Within normal limits.  BONES: Within normal limits.    IMPRESSION:  High-grade small bowel obstruction with single transition point in the ileum, and a similar configuration to prior bowel obstruction 04/08/2018.  No evidence of gastrointestinal perforation, abscess/drainable fluid collection or secondary CT signs of bowel ischemia.  The patient would likely benefit from nasogastric tube placement.  I discussed the findings with Dr. Drew on 11/29/2020 at approximately 4:35 AM.    < end of copied text >

## 2020-11-30 LAB
ALBUMIN SERPL ELPH-MCNC: 3 G/DL — LOW (ref 3.3–5)
ALP SERPL-CCNC: 42 U/L — SIGNIFICANT CHANGE UP (ref 40–120)
ALT FLD-CCNC: 46 U/L — HIGH (ref 10–45)
ANION GAP SERPL CALC-SCNC: 11 MMOL/L — SIGNIFICANT CHANGE UP (ref 5–17)
AST SERPL-CCNC: 19 U/L — SIGNIFICANT CHANGE UP (ref 10–40)
BASOPHILS # BLD AUTO: 0.04 K/UL — SIGNIFICANT CHANGE UP (ref 0–0.2)
BASOPHILS NFR BLD AUTO: 0.2 % — SIGNIFICANT CHANGE UP (ref 0–2)
BILIRUB SERPL-MCNC: 1.8 MG/DL — HIGH (ref 0.2–1.2)
BUN SERPL-MCNC: 17 MG/DL — SIGNIFICANT CHANGE UP (ref 7–23)
CALCIUM SERPL-MCNC: 7.7 MG/DL — LOW (ref 8.4–10.5)
CHLORIDE SERPL-SCNC: 104 MMOL/L — SIGNIFICANT CHANGE UP (ref 96–108)
CO2 SERPL-SCNC: 25 MMOL/L — SIGNIFICANT CHANGE UP (ref 22–31)
CREAT SERPL-MCNC: 1.28 MG/DL — SIGNIFICANT CHANGE UP (ref 0.5–1.3)
EOSINOPHIL # BLD AUTO: 0.05 K/UL — SIGNIFICANT CHANGE UP (ref 0–0.5)
EOSINOPHIL NFR BLD AUTO: 0.3 % — SIGNIFICANT CHANGE UP (ref 0–6)
GLUCOSE SERPL-MCNC: 168 MG/DL — HIGH (ref 70–99)
GRAM STN FLD: SIGNIFICANT CHANGE UP
HCT VFR BLD CALC: 39.8 % — SIGNIFICANT CHANGE UP (ref 39–50)
HCT VFR BLD CALC: 41.5 % — SIGNIFICANT CHANGE UP (ref 39–50)
HGB BLD-MCNC: 13.2 G/DL — SIGNIFICANT CHANGE UP (ref 13–17)
HGB BLD-MCNC: 14.1 G/DL — SIGNIFICANT CHANGE UP (ref 13–17)
IMM GRANULOCYTES NFR BLD AUTO: 0.5 % — SIGNIFICANT CHANGE UP (ref 0–1.5)
LACTATE SERPL-SCNC: 3.4 MMOL/L — HIGH (ref 0.7–2)
LACTATE SERPL-SCNC: 3.7 MMOL/L — HIGH (ref 0.7–2)
LYMPHOCYTES # BLD AUTO: 1.05 K/UL — SIGNIFICANT CHANGE UP (ref 1–3.3)
LYMPHOCYTES # BLD AUTO: 5.9 % — LOW (ref 13–44)
MAGNESIUM SERPL-MCNC: 1.6 MG/DL — SIGNIFICANT CHANGE UP (ref 1.6–2.6)
MCHC RBC-ENTMCNC: 31.3 PG — SIGNIFICANT CHANGE UP (ref 27–34)
MCHC RBC-ENTMCNC: 31.8 PG — SIGNIFICANT CHANGE UP (ref 27–34)
MCHC RBC-ENTMCNC: 33.2 GM/DL — SIGNIFICANT CHANGE UP (ref 32–36)
MCHC RBC-ENTMCNC: 34 GM/DL — SIGNIFICANT CHANGE UP (ref 32–36)
MCV RBC AUTO: 93.7 FL — SIGNIFICANT CHANGE UP (ref 80–100)
MCV RBC AUTO: 94.3 FL — SIGNIFICANT CHANGE UP (ref 80–100)
MONOCYTES # BLD AUTO: 0.67 K/UL — SIGNIFICANT CHANGE UP (ref 0–0.9)
MONOCYTES NFR BLD AUTO: 3.8 % — SIGNIFICANT CHANGE UP (ref 2–14)
NEUTROPHILS # BLD AUTO: 15.89 K/UL — HIGH (ref 1.8–7.4)
NEUTROPHILS NFR BLD AUTO: 89.3 % — HIGH (ref 43–77)
NRBC # BLD: 0 /100 WBCS — SIGNIFICANT CHANGE UP (ref 0–0)
NRBC # BLD: 0 /100 WBCS — SIGNIFICANT CHANGE UP (ref 0–0)
PLATELET # BLD AUTO: 279 K/UL — SIGNIFICANT CHANGE UP (ref 150–400)
PLATELET # BLD AUTO: 308 K/UL — SIGNIFICANT CHANGE UP (ref 150–400)
POTASSIUM SERPL-MCNC: 4 MMOL/L — SIGNIFICANT CHANGE UP (ref 3.5–5.3)
POTASSIUM SERPL-SCNC: 4 MMOL/L — SIGNIFICANT CHANGE UP (ref 3.5–5.3)
PROT SERPL-MCNC: 6 G/DL — SIGNIFICANT CHANGE UP (ref 6–8.3)
RBC # BLD: 4.22 M/UL — SIGNIFICANT CHANGE UP (ref 4.2–5.8)
RBC # BLD: 4.43 M/UL — SIGNIFICANT CHANGE UP (ref 4.2–5.8)
RBC # FLD: 12.5 % — SIGNIFICANT CHANGE UP (ref 10.3–14.5)
RBC # FLD: 12.8 % — SIGNIFICANT CHANGE UP (ref 10.3–14.5)
SODIUM SERPL-SCNC: 140 MMOL/L — SIGNIFICANT CHANGE UP (ref 135–145)
SPECIMEN SOURCE: SIGNIFICANT CHANGE UP
WBC # BLD: 15.28 K/UL — HIGH (ref 3.8–10.5)
WBC # BLD: 17.79 K/UL — HIGH (ref 3.8–10.5)
WBC # FLD AUTO: 15.28 K/UL — HIGH (ref 3.8–10.5)
WBC # FLD AUTO: 17.79 K/UL — HIGH (ref 3.8–10.5)

## 2020-11-30 PROCEDURE — 71045 X-RAY EXAM CHEST 1 VIEW: CPT | Mod: 26

## 2020-11-30 PROCEDURE — 99233 SBSQ HOSP IP/OBS HIGH 50: CPT

## 2020-11-30 RX ORDER — HYDROMORPHONE HYDROCHLORIDE 2 MG/ML
1 INJECTION INTRAMUSCULAR; INTRAVENOUS; SUBCUTANEOUS EVERY 4 HOURS
Refills: 0 | Status: DISCONTINUED | OUTPATIENT
Start: 2020-11-30 | End: 2020-12-02

## 2020-11-30 RX ADMIN — HYDROMORPHONE HYDROCHLORIDE 1 MILLIGRAM(S): 2 INJECTION INTRAMUSCULAR; INTRAVENOUS; SUBCUTANEOUS at 07:00

## 2020-11-30 RX ADMIN — MORPHINE SULFATE 2 MILLIGRAM(S): 50 CAPSULE, EXTENDED RELEASE ORAL at 02:54

## 2020-11-30 RX ADMIN — HYDROMORPHONE HYDROCHLORIDE 1 MILLIGRAM(S): 2 INJECTION INTRAMUSCULAR; INTRAVENOUS; SUBCUTANEOUS at 23:31

## 2020-11-30 RX ADMIN — ENOXAPARIN SODIUM 40 MILLIGRAM(S): 100 INJECTION SUBCUTANEOUS at 14:32

## 2020-11-30 RX ADMIN — HYDROMORPHONE HYDROCHLORIDE 1 MILLIGRAM(S): 2 INJECTION INTRAMUSCULAR; INTRAVENOUS; SUBCUTANEOUS at 10:20

## 2020-11-30 RX ADMIN — HYDROMORPHONE HYDROCHLORIDE 1 MILLIGRAM(S): 2 INJECTION INTRAMUSCULAR; INTRAVENOUS; SUBCUTANEOUS at 06:47

## 2020-11-30 RX ADMIN — Medication 200 MILLIGRAM(S): at 21:16

## 2020-11-30 RX ADMIN — HYDROMORPHONE HYDROCHLORIDE 1 MILLIGRAM(S): 2 INJECTION INTRAMUSCULAR; INTRAVENOUS; SUBCUTANEOUS at 14:45

## 2020-11-30 RX ADMIN — Medication 100 MILLIEQUIVALENT(S): at 00:13

## 2020-11-30 RX ADMIN — HYDROMORPHONE HYDROCHLORIDE 1 MILLIGRAM(S): 2 INJECTION INTRAMUSCULAR; INTRAVENOUS; SUBCUTANEOUS at 00:12

## 2020-11-30 RX ADMIN — HYDROMORPHONE HYDROCHLORIDE 1 MILLIGRAM(S): 2 INJECTION INTRAMUSCULAR; INTRAVENOUS; SUBCUTANEOUS at 18:34

## 2020-11-30 RX ADMIN — HYDROMORPHONE HYDROCHLORIDE 1 MILLIGRAM(S): 2 INJECTION INTRAMUSCULAR; INTRAVENOUS; SUBCUTANEOUS at 00:30

## 2020-11-30 RX ADMIN — HYDROMORPHONE HYDROCHLORIDE 1 MILLIGRAM(S): 2 INJECTION INTRAMUSCULAR; INTRAVENOUS; SUBCUTANEOUS at 14:32

## 2020-11-30 RX ADMIN — Medication 200 MILLIGRAM(S): at 10:06

## 2020-11-30 RX ADMIN — HYDROMORPHONE HYDROCHLORIDE 1 MILLIGRAM(S): 2 INJECTION INTRAMUSCULAR; INTRAVENOUS; SUBCUTANEOUS at 22:31

## 2020-11-30 RX ADMIN — MORPHINE SULFATE 2 MILLIGRAM(S): 50 CAPSULE, EXTENDED RELEASE ORAL at 03:10

## 2020-11-30 RX ADMIN — Medication 100 MILLIGRAM(S): at 08:54

## 2020-11-30 RX ADMIN — Medication 100 MILLIGRAM(S): at 16:26

## 2020-11-30 RX ADMIN — HYDROMORPHONE HYDROCHLORIDE 1 MILLIGRAM(S): 2 INJECTION INTRAMUSCULAR; INTRAVENOUS; SUBCUTANEOUS at 10:05

## 2020-11-30 NOTE — PROGRESS NOTE ADULT - SUBJECTIVE AND OBJECTIVE BOX
POD #1    Tmax - 100  WBC elevated - probably reactive  Dressing dry and intact  Small amount of drainage from NG tube    PLAN: Continue NG tube and MODE           D/C Zheng in am

## 2020-11-30 NOTE — PROGRESS NOTE ADULT - SUBJECTIVE AND OBJECTIVE BOX
Patient is a 50y old  Male who presents with a chief complaint of abd pain, SBO (30 Nov 2020 19:43)      BRIEF HOSPITAL COURSE:     50M w/ prior SBO, now with repeat episode of SBO, is currently POD #1 from ex-lap and small bowel resection.    Events last 24 hours:   -NGT in place, little output    PAST MEDICAL & SURGICAL HISTORY:  No pertinent past medical history    SBO (small bowel obstruction)  s/p sx 1991    H/O abdominal surgery  at age 100 days        Review of Systems:  CONSTITUTIONAL: No fever, chills, or fatigue  EYES: No eye pain, visual disturbances, or discharge  ENMT:  No difficulty hearing, tinnitus, vertigo; No sinus or throat pain  NECK: No pain or stiffness  RESPIRATORY: No cough, wheezing, chills or hemoptysis; No shortness of breath  CARDIOVASCULAR: No chest pain, palpitations, dizziness, or leg swelling  GASTROINTESTINAL: mild abd pain  GENITOURINARY: No dysuria, frequency, hematuria, or incontinence  NEUROLOGICAL: No headaches, memory loss, loss of strength, numbness, or tremors  SKIN: No itching, burning, rashes, or lesions   MUSCULOSKELETAL: No joint pain or swelling; No muscle, back, or extremity pain  PSYCHIATRIC: No depression, anxiety, mood swings, or difficulty sleeping      Medications:  ciprofloxacin   IVPB 400 milliGRAM(s) IV Intermittent every 12 hours  metroNIDAZOLE  IVPB 500 milliGRAM(s) IV Intermittent every 8 hours        HYDROmorphone  Injectable 1 milliGRAM(s) IV Push every 4 hours PRN  morphine  - Injectable 4 milliGRAM(s) IV Push every 4 hours PRN  morphine  - Injectable 2 milliGRAM(s) IV Push every 4 hours PRN  ondansetron Injectable 4 milliGRAM(s) IV Push every 6 hours PRN      enoxaparin Injectable 40 milliGRAM(s) SubCutaneous daily          lactated ringers. 1000 milliLiter(s) IV Continuous <Continuous>    influenza   Vaccine 0.5 milliLiter(s) IntraMuscular once              ICU Vital Signs Last 24 Hrs  T(C): 37.1 (30 Nov 2020 20:21), Max: 37.1 (30 Nov 2020 20:21)  T(F): 98.7 (30 Nov 2020 20:21), Max: 98.7 (30 Nov 2020 20:21)  HR: 91 (30 Nov 2020 20:21) (90 - 94)  BP: 130/83 (30 Nov 2020 20:21) (106/72 - 131/78)  BP(mean): 92 (30 Nov 2020 05:07) (92 - 92)  RR: 18 (30 Nov 2020 20:21) (16 - 20)  SpO2: 92% (30 Nov 2020 20:21) (92% - 95%)          I&O's Detail    29 Nov 2020 07:01  -  30 Nov 2020 07:00  --------------------------------------------------------  IN:    Lactated Ringers: 75 mL  Total IN: 75 mL    OUT:    Indwelling Catheter - Urethral (mL): 700 mL    Nasogastric/Oral tube (mL): 175 mL  Total OUT: 875 mL    Total NET: -800 mL      30 Nov 2020 07:01  -  01 Dec 2020 00:07  --------------------------------------------------------  IN:    Lactated Ringers: 900 mL  Total IN: 900 mL    OUT:    Indwelling Catheter - Urethral (mL): 450 mL    Nasogastric/Oral tube (mL): 200 mL  Total OUT: 650 mL    Total NET: 250 mL            LABS:                        13.2   15.28 )-----------( 279      ( 30 Nov 2020 11:10 )             39.8     11-30    140  |  104  |  17  ----------------------------<  168<H>  4.0   |  25  |  1.28    Ca    7.7<L>      30 Nov 2020 06:00  Mg     1.6     11-30    TPro  6.0  /  Alb  3.0<L>  /  TBili  1.8<H>  /  DBili  x   /  AST  19  /  ALT  46<H>  /  AlkPhos  42  11-30          CAPILLARY BLOOD GLUCOSE        PT/INR - ( 29 Nov 2020 09:00 )   PT: 10.8 sec;   INR: 0.89 ratio         PTT - ( 29 Nov 2020 09:00 )  PTT:28.2 sec    CULTURES:  Culture Results:   No growth (11-29-20 @ 18:45)      Physical Examination:    General: No acute distress.  Alert, oriented, interactive, nonfocal. NGT in place    HEENT: Pupils equal, reactive to light.  Symmetric.    PULM: Clear to auscultation bilaterally, no significant sputum production    CVS: Regular rate and rhythm, no murmurs, rubs, or gallops    ABD: Soft, nondistended, mild pain to palpation. Surgicval dressings in place CDI    EXT: No edema, nontender    SKIN: Warm and well perfused, no rashes noted.

## 2020-11-30 NOTE — PROGRESS NOTE ADULT - SUBJECTIVE AND OBJECTIVE BOX
Patient is a 50y old  Male who presents with a chief complaint of abd pain, SBO (30 Nov 2020 09:42)      SUBJECTIVE / OVERNIGHT EVENTS:  Pt seen and examined at bedside. No acute events overnight. Endorses pain at surgical site.   Pt denies cp, palpitations, sob, N/V, fever, chills.    ROS:  All other review of systems negative    Allergies    No Known Allergies    Intolerances        MEDICATIONS  (STANDING):  ciprofloxacin   IVPB 400 milliGRAM(s) IV Intermittent every 12 hours  enoxaparin Injectable 40 milliGRAM(s) SubCutaneous daily  influenza   Vaccine 0.5 milliLiter(s) IntraMuscular once  lactated ringers. 1000 milliLiter(s) (100 mL/Hr) IV Continuous <Continuous>  metroNIDAZOLE  IVPB 500 milliGRAM(s) IV Intermittent every 8 hours  pantoprazole  Injectable 40 milliGRAM(s) IV Push daily    MEDICATIONS  (PRN):  HYDROmorphone  Injectable 1 milliGRAM(s) IV Push every 4 hours PRN Severe Pain (7 - 10)  morphine  - Injectable 4 milliGRAM(s) IV Push every 4 hours PRN Moderate Pain (4 - 6)  morphine  - Injectable 2 milliGRAM(s) IV Push every 4 hours PRN Mild Pain (1 - 3)  ondansetron Injectable 4 milliGRAM(s) IV Push every 6 hours PRN Nausea      Vital Signs Last 24 Hrs  T(C): 36.4 (30 Nov 2020 08:44), Max: 37.8 (29 Nov 2020 19:45)  T(F): 97.5 (30 Nov 2020 08:44), Max: 100 (29 Nov 2020 19:45)  HR: 94 (30 Nov 2020 08:44) (49 - 103)  BP: 106/72 (30 Nov 2020 08:44) (106/72 - 134/71)  BP(mean): 92 (30 Nov 2020 05:07) (88 - 92)  RR: 16 (30 Nov 2020 08:44) (15 - 19)  SpO2: 94% (30 Nov 2020 08:44) (94% - 97%)  CAPILLARY BLOOD GLUCOSE        I&O's Summary    29 Nov 2020 07:01  -  30 Nov 2020 07:00  --------------------------------------------------------  IN: 75 mL / OUT: 875 mL / NET: -800 mL        PHYSICAL EXAM:  GENERAL: NAD, well-developed  HEENT: Atraumatic, Normocephalic, EOMI, PERRLA, conjunctiva and sclera clear, NGT in place, Supple, No JVD  CHEST/LUNG: Clear to auscultation bilaterally; No wheeze  HEART: Regular rate and rhythm; No murmurs, rubs, or gallops  ABDOMEN: Soft, tender to palpation at surgery site, Nondistended; Bowel sounds present  : + Zheng in place  EXTREMITIES:  2+ Peripheral Pulses, No clubbing, cyanosis, or edema  NEUROLOGY: AAOx3, non-focal  PSYCH: calm  SKIN: Surgical incision site c/d/i dressing     LABS:                        14.1   17.79 )-----------( 308      ( 30 Nov 2020 06:00 )             41.5     11-30    140  |  104  |  17  ----------------------------<  168<H>  4.0   |  25  |  1.28    Ca    7.7<L>      30 Nov 2020 06:00  Mg     1.6     11-30    TPro  6.0  /  Alb  3.0<L>  /  TBili  1.8<H>  /  DBili  x   /  AST  19  /  ALT  46<H>  /  AlkPhos  42  11-30    PT/INR - ( 29 Nov 2020 09:00 )   PT: 10.8 sec;   INR: 0.89 ratio         PTT - ( 29 Nov 2020 09:00 )  PTT:28.2 sec          RADIOLOGY & ADDITIONAL TESTS:  Results Reviewed:   Imaging Personally Reviewed:  Electrocardiogram Personally Reviewed:    COORDINATION OF CARE:  Care Discussed with Consultants/Other Providers [Y/N]:  Prior or Outpatient Records Reviewed [Y/N]:

## 2020-11-30 NOTE — PROGRESS NOTE ADULT - ASSESSMENT
50M hx of abdominal surgery at 100 days ago (unclear what type), hx of SBO s/p ASHLEY 1991, hx of SBO in 4/2018 presented with abd pain, noted to have severe sepsis secondary to SBO with ischemic bowel s/p ex lap and small bowel resection    #Severe Sepsis  #SBO  #Ischemic Bowel  -S/p small bowel resection POD 1  -Follow up with surgery recommendations  -Maintain NGT for now  -Pain control  -Continue Abx for now  -Monitor vitals    #Leukocytosis  -Likely reactive post operative  -Monitor while on abx     #Hyperbilirubinemia  -Likely reactive post operative  -Trend CMP    #DVT/GI proph  Lovenox  Protonix

## 2020-11-30 NOTE — PHARMACOTHERAPY INTERVENTION NOTE - COMMENTS
Pt is POD#1 small bowel resection. Due to IV Protonix shortage, clarified if pt still needs. MD agreed to d/c.

## 2020-11-30 NOTE — PROGRESS NOTE ADULT - ASSESSMENT
50M w/ prior SBO, now with repeat episode of SBO, is currently POD #1 from ex-lap and small bowel resection.    Events last 24 hours:   -NGT in place, little output    PLAN:  -patient had fever of 100 last niht and elevated WBCs this Am, now on antibiotics continue for compelte course (Cipro/flagyl)  -maitnain NGT  -serial abd exams  -pain control  -assess for BM/flatus  -PRN zofran  -AM lab review ok, follow lytes with NGT in place  -DVT prophylaxis with lovenox

## 2020-12-01 LAB
ANION GAP SERPL CALC-SCNC: 8 MMOL/L — SIGNIFICANT CHANGE UP (ref 5–17)
BUN SERPL-MCNC: 16 MG/DL — SIGNIFICANT CHANGE UP (ref 7–23)
CALCIUM SERPL-MCNC: 8.2 MG/DL — LOW (ref 8.4–10.5)
CHLORIDE SERPL-SCNC: 103 MMOL/L — SIGNIFICANT CHANGE UP (ref 96–108)
CO2 SERPL-SCNC: 28 MMOL/L — SIGNIFICANT CHANGE UP (ref 22–31)
CREAT SERPL-MCNC: 1.04 MG/DL — SIGNIFICANT CHANGE UP (ref 0.5–1.3)
GLUCOSE SERPL-MCNC: 137 MG/DL — HIGH (ref 70–99)
POTASSIUM SERPL-MCNC: 3.7 MMOL/L — SIGNIFICANT CHANGE UP (ref 3.5–5.3)
POTASSIUM SERPL-SCNC: 3.7 MMOL/L — SIGNIFICANT CHANGE UP (ref 3.5–5.3)
SODIUM SERPL-SCNC: 139 MMOL/L — SIGNIFICANT CHANGE UP (ref 135–145)

## 2020-12-01 PROCEDURE — 99232 SBSQ HOSP IP/OBS MODERATE 35: CPT

## 2020-12-01 RX ORDER — HYDROCORTISONE 1 %
1 OINTMENT (GRAM) TOPICAL
Refills: 0 | Status: DISCONTINUED | OUTPATIENT
Start: 2020-12-01 | End: 2020-12-07

## 2020-12-01 RX ADMIN — HYDROMORPHONE HYDROCHLORIDE 1 MILLIGRAM(S): 2 INJECTION INTRAMUSCULAR; INTRAVENOUS; SUBCUTANEOUS at 19:20

## 2020-12-01 RX ADMIN — HYDROMORPHONE HYDROCHLORIDE 1 MILLIGRAM(S): 2 INJECTION INTRAMUSCULAR; INTRAVENOUS; SUBCUTANEOUS at 11:33

## 2020-12-01 RX ADMIN — HYDROMORPHONE HYDROCHLORIDE 1 MILLIGRAM(S): 2 INJECTION INTRAMUSCULAR; INTRAVENOUS; SUBCUTANEOUS at 22:48

## 2020-12-01 RX ADMIN — HYDROMORPHONE HYDROCHLORIDE 1 MILLIGRAM(S): 2 INJECTION INTRAMUSCULAR; INTRAVENOUS; SUBCUTANEOUS at 07:34

## 2020-12-01 RX ADMIN — HYDROMORPHONE HYDROCHLORIDE 1 MILLIGRAM(S): 2 INJECTION INTRAMUSCULAR; INTRAVENOUS; SUBCUTANEOUS at 23:04

## 2020-12-01 RX ADMIN — ENOXAPARIN SODIUM 40 MILLIGRAM(S): 100 INJECTION SUBCUTANEOUS at 11:28

## 2020-12-01 RX ADMIN — Medication 200 MILLIGRAM(S): at 08:35

## 2020-12-01 RX ADMIN — HYDROMORPHONE HYDROCHLORIDE 1 MILLIGRAM(S): 2 INJECTION INTRAMUSCULAR; INTRAVENOUS; SUBCUTANEOUS at 03:07

## 2020-12-01 RX ADMIN — HYDROMORPHONE HYDROCHLORIDE 1 MILLIGRAM(S): 2 INJECTION INTRAMUSCULAR; INTRAVENOUS; SUBCUTANEOUS at 14:53

## 2020-12-01 RX ADMIN — Medication 1 APPLICATION(S): at 15:05

## 2020-12-01 RX ADMIN — Medication 100 MILLIGRAM(S): at 15:17

## 2020-12-01 RX ADMIN — HYDROMORPHONE HYDROCHLORIDE 1 MILLIGRAM(S): 2 INJECTION INTRAMUSCULAR; INTRAVENOUS; SUBCUTANEOUS at 07:19

## 2020-12-01 RX ADMIN — Medication 100 MILLIGRAM(S): at 07:21

## 2020-12-01 RX ADMIN — HYDROMORPHONE HYDROCHLORIDE 1 MILLIGRAM(S): 2 INJECTION INTRAMUSCULAR; INTRAVENOUS; SUBCUTANEOUS at 18:35

## 2020-12-01 RX ADMIN — Medication 100 MILLIGRAM(S): at 00:30

## 2020-12-01 RX ADMIN — Medication 100 MILLIGRAM(S): at 23:00

## 2020-12-01 RX ADMIN — Medication 200 MILLIGRAM(S): at 20:23

## 2020-12-01 RX ADMIN — HYDROMORPHONE HYDROCHLORIDE 1 MILLIGRAM(S): 2 INJECTION INTRAMUSCULAR; INTRAVENOUS; SUBCUTANEOUS at 11:49

## 2020-12-01 RX ADMIN — HYDROMORPHONE HYDROCHLORIDE 1 MILLIGRAM(S): 2 INJECTION INTRAMUSCULAR; INTRAVENOUS; SUBCUTANEOUS at 15:15

## 2020-12-01 RX ADMIN — HYDROMORPHONE HYDROCHLORIDE 1 MILLIGRAM(S): 2 INJECTION INTRAMUSCULAR; INTRAVENOUS; SUBCUTANEOUS at 02:52

## 2020-12-01 NOTE — PROGRESS NOTE ADULT - SUBJECTIVE AND OBJECTIVE BOX
POD #2 Ex LAP/SB resection    Pt resting comfortably in bed. No new complaints. No SOB No CP +flatus      Vital Signs Last 24 Hrs  T(C): 36.9 (01 Dec 2020 08:40), Max: 37.2 (01 Dec 2020 00:24)  T(F): 98.4 (01 Dec 2020 08:40), Max: 99 (01 Dec 2020 00:24)  HR: 94 (01 Dec 2020 08:40) (85 - 94)  BP: 128/79 (01 Dec 2020 08:40) (126/84 - 132/80)  BP(mean): --  RR: 18 (01 Dec 2020 08:40) (17 - 20)  SpO2: 94% (01 Dec 2020 08:40) (92% - 97%)     PE:  GEn: A&Ox3 NAD  ABD: dressing intact small amount of drainage.  +BS soft  EXT: no c/c/e +DP pulses              I&O's Detail    30 Nov 2020 07:01  -  01 Dec 2020 07:00  --------------------------------------------------------  IN:    IV PiggyBack: 100 mL    Lactated Ringers: 900 mL  Total IN: 1000 mL    OUT:    Indwelling Catheter - Urethral (mL): 1100 mL    Nasogastric/Oral tube (mL): 200 mL  Total OUT: 1300 mL    Total NET: -300 mL      01 Dec 2020 07:01  -  01 Dec 2020 10:56  --------------------------------------------------------  IN:    IV PiggyBack: 100 mL    IV PiggyBack: 200 mL    Lactated Ringers: 100 mL  Total IN: 400 mL    OUT:    Indwelling Catheter - Urethral (mL): 600 mL    Nasogastric/Oral tube (mL): 250 mL  Total OUT: 850 mL    Total NET: -450 mL    12-01    139  |  103  |  16  ----------------------------<  137<H>  3.7   |  28  |  1.04    Ca    8.2<L>      01 Dec 2020 05:45

## 2020-12-01 NOTE — PROGRESS NOTE ADULT - SUBJECTIVE AND OBJECTIVE BOX
HPI:  50M hx of abdominal surgery at 100 days ago (unclear what type), hx of SBO s/p ASHLEY 1991, hx of SBO in 4/2018 resolved with conservative tmt pw abd pain. Abd pain started moderately around 5PM, was able to tolerate dinner, had self treated with PeptoBismol. Woke up around 1200 with worsening sxs and vomiting x 1,  self treated with Faby [place] with no benefit and came to ED. In ED, afebrile hemodynamically stable. WBC:10.9 AST:70. CTAP with high grade SBO.  (29 Nov 2020 05:31)      Subjective    Pos abd pain. c/o itching in groin area/upper thigh area and requesting steroid cream.       PAST MEDICAL & SURGICAL HISTORY:  No pertinent past medical history    SBO (small bowel obstruction)  s/p sx 1991    H/O abdominal surgery  at age 100 days        MedsMEDICATIONS  (STANDING):  ciprofloxacin   IVPB 400 milliGRAM(s) IV Intermittent every 12 hours  enoxaparin Injectable 40 milliGRAM(s) SubCutaneous daily  hydrocortisone 2.5% Ointment 1 Application(s) Topical two times a day  influenza   Vaccine 0.5 milliLiter(s) IntraMuscular once  lactated ringers. 1000 milliLiter(s) (100 mL/Hr) IV Continuous <Continuous>  metroNIDAZOLE  IVPB 500 milliGRAM(s) IV Intermittent every 8 hours    MEDICATIONS  (PRN):  HYDROmorphone  Injectable 1 milliGRAM(s) IV Push every 4 hours PRN Severe Pain (7 - 10)  morphine  - Injectable 4 milliGRAM(s) IV Push every 4 hours PRN Moderate Pain (4 - 6)  morphine  - Injectable 2 milliGRAM(s) IV Push every 4 hours PRN Mild Pain (1 - 3)  ondansetron Injectable 4 milliGRAM(s) IV Push every 6 hours PRN Nausea      Vital Signs Last 24 Hrs  T(C): 36.9 (01 Dec 2020 08:40), Max: 37.2 (01 Dec 2020 00:24)  T(F): 98.4 (01 Dec 2020 08:40), Max: 99 (01 Dec 2020 00:24)  HR: 94 (01 Dec 2020 08:40) (85 - 94)  BP: 128/79 (01 Dec 2020 08:40) (126/84 - 132/80)  BP(mean): --  RR: 18 (01 Dec 2020 08:40) (17 - 20)  SpO2: 94% (01 Dec 2020 08:40) (92% - 97%)  I&O's Summary    30 Nov 2020 07:01  -  01 Dec 2020 07:00  --------------------------------------------------------  IN: 1000 mL / OUT: 1300 mL / NET: -300 mL    01 Dec 2020 07:01  -  01 Dec 2020 11:29  --------------------------------------------------------  IN: 400 mL / OUT: 850 mL / NET: -450 mL        PHYSICAL EXAM:  GENERAL: NAD  NECK: Supple  NERVOUS SYSTEM:  awake and alert  HEART: S1s2 NL , RRR  CHEST/LUNG: Clear to percussion bilaterally  ABDOMEN: hard, distended, hypoactive bs, diffuse tenderness. pos NGT  EXTREMITIES:  trace edema      LABS:(11-30 @ 11:10)                      13.2  15.28 )-----------( 279                 39.8    Neutrophils = -- (--%)  Lymphocytes = -- (--%)  Eosinophils = -- (--%)  Basophils = -- (--%)  Monocytes = -- (--%)  Bands = --%    12-01    139  |  103  |  16  ----------------------------<  137<H>  3.7   |  28  |  1.04    Ca    8.2<L>      01 Dec 2020 05:45  Mg     1.6     11-30    TPro  6.0  /  Alb  3.0<L>  /  TBili  1.8<H>  /  DBili  x   /  AST  19  /  ALT  46<H>  /  AlkPhos  42  11-30          CAPILLARY BLOOD GLUCOSE        Culture - Body Fluid with Gram Stain (collected 29 Nov 2020 18:45)  Source: .Body Fluid PERITONEAL FLUID  Gram Stain (30 Nov 2020 04:58):    Rare polymorphonuclear leukocytes seen per low power field    No organisms seen per oil power field  Preliminary Report (30 Nov 2020 16:52):    No growth      Imaging Personally Reviewed:  [ ] YES  [ ] NO        Care Discussed with Consultants/Other Providers [ x] YES  [ ] NO

## 2020-12-01 NOTE — PROGRESS NOTE ADULT - ASSESSMENT
50M hx of abdominal surgery as infant, , hx of SBO s/p ASHLEY 1991, hx of SBO in 4/2018 presented with abd pain, noted to have severe sepsis secondary to SBO with ischemic bowel s/p ex lap and small bowel resection    Sepsis sec to ischemic bowel with SBO s/p resection   NGT per sx  pain control  IVF  NPO  DC cortez  OOB  Cipro/Flagyl

## 2020-12-01 NOTE — DIETITIAN INITIAL EVALUATION ADULT. - OTHER INFO
50M hx of abdominal surgery at 100 days ago (unclear what type), hx of SBO s/p ASHLEY 1991, hx of SBO in 4/2018 resolved with conservative treatment presents with  abd pain and recurrent SBO. Patient POD # 2, NPO with NGT noted . Regular diet PTA , no recent weight loss noted. Last BM (11/29) , Skin is intact surgical incision noted. ? recent weight of 198, patient does not appear documented weight .

## 2020-12-02 LAB
ALBUMIN SERPL ELPH-MCNC: 2.6 G/DL — LOW (ref 3.3–5)
ALP SERPL-CCNC: 61 U/L — SIGNIFICANT CHANGE UP (ref 40–120)
ALT FLD-CCNC: 37 U/L — SIGNIFICANT CHANGE UP (ref 10–45)
ANION GAP SERPL CALC-SCNC: 10 MMOL/L — SIGNIFICANT CHANGE UP (ref 5–17)
AST SERPL-CCNC: 21 U/L — SIGNIFICANT CHANGE UP (ref 10–40)
BASOPHILS # BLD AUTO: 0.03 K/UL — SIGNIFICANT CHANGE UP (ref 0–0.2)
BASOPHILS NFR BLD AUTO: 0.3 % — SIGNIFICANT CHANGE UP (ref 0–2)
BILIRUB SERPL-MCNC: 1.2 MG/DL — SIGNIFICANT CHANGE UP (ref 0.2–1.2)
BUN SERPL-MCNC: 11 MG/DL — SIGNIFICANT CHANGE UP (ref 7–23)
CALCIUM SERPL-MCNC: 8.5 MG/DL — SIGNIFICANT CHANGE UP (ref 8.4–10.5)
CHLORIDE SERPL-SCNC: 102 MMOL/L — SIGNIFICANT CHANGE UP (ref 96–108)
CO2 SERPL-SCNC: 27 MMOL/L — SIGNIFICANT CHANGE UP (ref 22–31)
CREAT SERPL-MCNC: 0.85 MG/DL — SIGNIFICANT CHANGE UP (ref 0.5–1.3)
EOSINOPHIL # BLD AUTO: 0.02 K/UL — SIGNIFICANT CHANGE UP (ref 0–0.5)
EOSINOPHIL NFR BLD AUTO: 0.2 % — SIGNIFICANT CHANGE UP (ref 0–6)
GLUCOSE BLDC GLUCOMTR-MCNC: 123 MG/DL — HIGH (ref 70–99)
GLUCOSE SERPL-MCNC: 115 MG/DL — HIGH (ref 70–99)
HCT VFR BLD CALC: 32.2 % — LOW (ref 39–50)
HGB BLD-MCNC: 11 G/DL — LOW (ref 13–17)
IMM GRANULOCYTES NFR BLD AUTO: 0.6 % — SIGNIFICANT CHANGE UP (ref 0–1.5)
LACTATE SERPL-SCNC: 1 MMOL/L — SIGNIFICANT CHANGE UP (ref 0.7–2)
LYMPHOCYTES # BLD AUTO: 1.06 K/UL — SIGNIFICANT CHANGE UP (ref 1–3.3)
LYMPHOCYTES # BLD AUTO: 10 % — LOW (ref 13–44)
MAGNESIUM SERPL-MCNC: 2 MG/DL — SIGNIFICANT CHANGE UP (ref 1.6–2.6)
MCHC RBC-ENTMCNC: 32.4 PG — SIGNIFICANT CHANGE UP (ref 27–34)
MCHC RBC-ENTMCNC: 34.2 GM/DL — SIGNIFICANT CHANGE UP (ref 32–36)
MCV RBC AUTO: 94.7 FL — SIGNIFICANT CHANGE UP (ref 80–100)
MONOCYTES # BLD AUTO: 0.54 K/UL — SIGNIFICANT CHANGE UP (ref 0–0.9)
MONOCYTES NFR BLD AUTO: 5.1 % — SIGNIFICANT CHANGE UP (ref 2–14)
NEUTROPHILS # BLD AUTO: 8.87 K/UL — HIGH (ref 1.8–7.4)
NEUTROPHILS NFR BLD AUTO: 83.8 % — HIGH (ref 43–77)
NRBC # BLD: 0 /100 WBCS — SIGNIFICANT CHANGE UP (ref 0–0)
PHOSPHATE SERPL-MCNC: 2 MG/DL — LOW (ref 2.5–4.5)
PLATELET # BLD AUTO: 262 K/UL — SIGNIFICANT CHANGE UP (ref 150–400)
POTASSIUM SERPL-MCNC: 3.5 MMOL/L — SIGNIFICANT CHANGE UP (ref 3.5–5.3)
POTASSIUM SERPL-SCNC: 3.5 MMOL/L — SIGNIFICANT CHANGE UP (ref 3.5–5.3)
PROT SERPL-MCNC: 6.3 G/DL — SIGNIFICANT CHANGE UP (ref 6–8.3)
RBC # BLD: 3.4 M/UL — LOW (ref 4.2–5.8)
RBC # FLD: 12.4 % — SIGNIFICANT CHANGE UP (ref 10.3–14.5)
SODIUM SERPL-SCNC: 139 MMOL/L — SIGNIFICANT CHANGE UP (ref 135–145)
WBC # BLD: 10.58 K/UL — HIGH (ref 3.8–10.5)
WBC # FLD AUTO: 10.58 K/UL — HIGH (ref 3.8–10.5)

## 2020-12-02 PROCEDURE — 99233 SBSQ HOSP IP/OBS HIGH 50: CPT

## 2020-12-02 RX ORDER — HYDROMORPHONE HYDROCHLORIDE 2 MG/ML
1 INJECTION INTRAMUSCULAR; INTRAVENOUS; SUBCUTANEOUS
Refills: 0 | Status: DISCONTINUED | OUTPATIENT
Start: 2020-12-02 | End: 2020-12-04

## 2020-12-02 RX ADMIN — HYDROMORPHONE HYDROCHLORIDE 1 MILLIGRAM(S): 2 INJECTION INTRAMUSCULAR; INTRAVENOUS; SUBCUTANEOUS at 15:14

## 2020-12-02 RX ADMIN — HYDROMORPHONE HYDROCHLORIDE 1 MILLIGRAM(S): 2 INJECTION INTRAMUSCULAR; INTRAVENOUS; SUBCUTANEOUS at 12:53

## 2020-12-02 RX ADMIN — HYDROMORPHONE HYDROCHLORIDE 1 MILLIGRAM(S): 2 INJECTION INTRAMUSCULAR; INTRAVENOUS; SUBCUTANEOUS at 03:13

## 2020-12-02 RX ADMIN — Medication 100 MILLIGRAM(S): at 17:04

## 2020-12-02 RX ADMIN — Medication 100 MILLIGRAM(S): at 08:35

## 2020-12-02 RX ADMIN — HYDROMORPHONE HYDROCHLORIDE 1 MILLIGRAM(S): 2 INJECTION INTRAMUSCULAR; INTRAVENOUS; SUBCUTANEOUS at 22:25

## 2020-12-02 RX ADMIN — HYDROMORPHONE HYDROCHLORIDE 1 MILLIGRAM(S): 2 INJECTION INTRAMUSCULAR; INTRAVENOUS; SUBCUTANEOUS at 07:15

## 2020-12-02 RX ADMIN — HYDROMORPHONE HYDROCHLORIDE 1 MILLIGRAM(S): 2 INJECTION INTRAMUSCULAR; INTRAVENOUS; SUBCUTANEOUS at 06:58

## 2020-12-02 RX ADMIN — HYDROMORPHONE HYDROCHLORIDE 1 MILLIGRAM(S): 2 INJECTION INTRAMUSCULAR; INTRAVENOUS; SUBCUTANEOUS at 18:59

## 2020-12-02 RX ADMIN — HYDROMORPHONE HYDROCHLORIDE 1 MILLIGRAM(S): 2 INJECTION INTRAMUSCULAR; INTRAVENOUS; SUBCUTANEOUS at 15:29

## 2020-12-02 RX ADMIN — Medication 1 APPLICATION(S): at 14:37

## 2020-12-02 RX ADMIN — SODIUM CHLORIDE 100 MILLILITER(S): 9 INJECTION, SOLUTION INTRAVENOUS at 22:00

## 2020-12-02 RX ADMIN — HYDROMORPHONE HYDROCHLORIDE 1 MILLIGRAM(S): 2 INJECTION INTRAMUSCULAR; INTRAVENOUS; SUBCUTANEOUS at 09:46

## 2020-12-02 RX ADMIN — ENOXAPARIN SODIUM 40 MILLIGRAM(S): 100 INJECTION SUBCUTANEOUS at 11:21

## 2020-12-02 RX ADMIN — Medication 200 MILLIGRAM(S): at 08:35

## 2020-12-02 RX ADMIN — HYDROMORPHONE HYDROCHLORIDE 1 MILLIGRAM(S): 2 INJECTION INTRAMUSCULAR; INTRAVENOUS; SUBCUTANEOUS at 22:01

## 2020-12-02 RX ADMIN — HYDROMORPHONE HYDROCHLORIDE 1 MILLIGRAM(S): 2 INJECTION INTRAMUSCULAR; INTRAVENOUS; SUBCUTANEOUS at 12:38

## 2020-12-02 RX ADMIN — Medication 200 MILLIGRAM(S): at 22:01

## 2020-12-02 RX ADMIN — HYDROMORPHONE HYDROCHLORIDE 1 MILLIGRAM(S): 2 INJECTION INTRAMUSCULAR; INTRAVENOUS; SUBCUTANEOUS at 10:01

## 2020-12-02 RX ADMIN — HYDROMORPHONE HYDROCHLORIDE 1 MILLIGRAM(S): 2 INJECTION INTRAMUSCULAR; INTRAVENOUS; SUBCUTANEOUS at 02:57

## 2020-12-02 RX ADMIN — HYDROMORPHONE HYDROCHLORIDE 1 MILLIGRAM(S): 2 INJECTION INTRAMUSCULAR; INTRAVENOUS; SUBCUTANEOUS at 19:30

## 2020-12-02 RX ADMIN — Medication 1 APPLICATION(S): at 05:47

## 2020-12-02 NOTE — PROGRESS NOTE ADULT - ASSESSMENT
Patient is a 50y old  Male who presents with a chief complaint of abd pain, SBO (01 Dec 2020 13:05)  pt POD # 3 s/p ex-lap, ASHLEY and small bowel resection with primary anastomosis  no events noted over night  pt still with NGT, Zheng is out, pt voiding on his own   pt still requiring Dilaudid around the clock due to incisional pain     pt doing well    afebrile    labs improving, wbc normalizing    intraop cx negative preliminarily    plan  - NGT clamping trial today, clamp tube for 3 hours, unclamp for 1 hour, if output < 150 cc, may remove NGT  - analgesia prn  -abx  - DVT prophylaxis    case d/w Dr. Cordero

## 2020-12-02 NOTE — PROVIDER CONTACT NOTE (OTHER) - REASON
Pt has red irritation on side of abdomen Pt has red irritation on side of abdomen, NG tube coming out of R smith slightly

## 2020-12-02 NOTE — PROGRESS NOTE ADULT - SUBJECTIVE AND OBJECTIVE BOX
HPI:  50M hx of abdominal surgery at 100 days ago (unclear what type), hx of SBO s/p ASHLEY 1991, hx of SBO in 4/2018 resolved with conservative tmt pw abd pain. Abd pain started moderately around 5PM, was able to tolerate dinner, had self treated with PeptoBismol. Woke up around 1200 with worsening sxs and vomiting x 1,  self treated with [facility] with no benefit and came to ED. In ED, afebrile hemodynamically stable. WBC:10.9 AST:70. CTAP with high grade SBO.  (29 Nov 2020 05:31)      Subjective    Pos Abd pain. Itchiness better. Voiding.         PAST MEDICAL & SURGICAL HISTORY:  No pertinent past medical history    SBO (small bowel obstruction)  s/p sx 1991    H/O abdominal surgery  at age 100 days        MedsMEDICATIONS  (STANDING):  ciprofloxacin   IVPB 400 milliGRAM(s) IV Intermittent every 12 hours  enoxaparin Injectable 40 milliGRAM(s) SubCutaneous daily  hydrocortisone 2.5% Ointment 1 Application(s) Topical two times a day  influenza   Vaccine 0.5 milliLiter(s) IntraMuscular once  lactated ringers. 1000 milliLiter(s) (100 mL/Hr) IV Continuous <Continuous>  metroNIDAZOLE  IVPB 500 milliGRAM(s) IV Intermittent every 8 hours    MEDICATIONS  (PRN):  HYDROmorphone  Injectable 1 milliGRAM(s) IV Push every 3 hours PRN Severe Pain (7 - 10)  morphine  - Injectable 4 milliGRAM(s) IV Push every 4 hours PRN Moderate Pain (4 - 6)  morphine  - Injectable 2 milliGRAM(s) IV Push every 4 hours PRN Mild Pain (1 - 3)  ondansetron Injectable 4 milliGRAM(s) IV Push every 6 hours PRN Nausea      Vital Signs Last 24 Hrs  T(C): 37.1 (02 Dec 2020 05:25), Max: 37.4 (01 Dec 2020 20:33)  T(F): 98.8 (02 Dec 2020 05:25), Max: 99.3 (01 Dec 2020 20:33)  HR: 77 (02 Dec 2020 05:25) (77 - 93)  BP: 135/83 (02 Dec 2020 05:25) (124/80 - 136/83)  BP(mean): --  RR: 16 (02 Dec 2020 05:25) (15 - 17)  SpO2: 96% (02 Dec 2020 05:25) (93% - 96%)  I&O's Summary    01 Dec 2020 07:01  -  02 Dec 2020 07:00  --------------------------------------------------------  IN: 2650 mL / OUT: 3700 mL / NET: -1050 mL        PHYSICAL EXAM:  GENERAL: NAD  NECK: Supple  NERVOUS SYSTEM:  awake and alert  HEART: S1s2 NL , RRR  CHEST/LUNG: Clear to percussion bilaterally  ABDOMEN: Soft, Nontender, Nondistended; Bowel sounds present  EXTREMITIES:  No edema      LABS:(12-02 @ 05:20)                      11.0  10.58 )-----------( 262                 32.2    Neutrophils = 8.87 (83.8%)  Lymphocytes = 1.06 (10.0%)  Eosinophils = 0.02 (0.2%)  Basophils = 0.03 (0.3%)  Monocytes = 0.54 (5.1%)  Bands = --%    12-02    139  |  102  |  11  ----------------------------<  115<H>  3.5   |  27  |  0.85    Ca    8.5      02 Dec 2020 05:20  Phos  2.0     12-02  Mg     2.0     12-02    TPro  6.3  /  Alb  2.6<L>  /  TBili  1.2  /  DBili  x   /  AST  21  /  ALT  37  /  AlkPhos  61  12-02            Culture - Body Fluid with Gram Stain (collected 29 Nov 2020 18:45)  Source: .Body Fluid PERITONEAL FLUID  Gram Stain (30 Nov 2020 04:58):    Rare polymorphonuclear leukocytes seen per low power field    No organisms seen per oil power field  Preliminary Report (30 Nov 2020 16:52):    No growth      Imaging Personally Reviewed:  [ ] YES  [ ] NO        Care Discussed with Consultants/Other Providers [ x] YES  [ ] NO    50M hx of abdominal surgery as infant, , hx of SBO s/p ASHLEY 1991, hx of SBO in 4/2018 presented with abd pain, noted to have severe sepsis secondary to SBO with ischemic bowel s/p ex lap and small bowel resection    Sepsis sec to ischemic bowel with SBO s/p resection   NGT per sx  pain control, inc freq   IVF  NPO  OOB  Cipro/Flagyl

## 2020-12-02 NOTE — PROVIDER CONTACT NOTE (OTHER) - ACTION/TREATMENT ORDERED:
JOSEPH Rodriguez notified and aware. No new orders at this time JOSEPH Rodriguez notified and aware. No new orders at this time. Do not advance NG tube

## 2020-12-02 NOTE — CDI QUERY NOTE - NSCDIOTHERTXTBX_GEN_ALL_CORE_HH
Notes - Sepsis sec to ischemic bowel with SBO s/p resection     Please clarify the acuity of ischemic bowel;    - acute ischemic bowel;  - chronic ischemic bowel;  - other ( please specify).    Thank you.

## 2020-12-02 NOTE — PROVIDER CONTACT NOTE (OTHER) - SITUATION
Pt c./o red irritation on both sides of abdomen Pt c./o red irritation on both sides of abdomen, NG tube coming out of R smith slightly

## 2020-12-02 NOTE — PROGRESS NOTE ADULT - SUBJECTIVE AND OBJECTIVE BOX
Patient is a 50y old  Male who presents with a chief complaint of abd pain, SBO (01 Dec 2020 13:05)  pt POD # 3 s/p ex-lap, ASHLEY and small bowel resection with primary anastomosis  no events noted over night  pt still with NGT, Zheng is out, pt voiding on his own   pt still requiring Dilaudid around the clock due to incisional pain     Patient seen and examined at bedside    ALLERGIES:  No Known Allergies    MEDICATIONS  (STANDING):  ciprofloxacin   IVPB 400 milliGRAM(s) IV Intermittent every 12 hours  enoxaparin Injectable 40 milliGRAM(s) SubCutaneous daily  hydrocortisone 2.5% Ointment 1 Application(s) Topical two times a day  influenza   Vaccine 0.5 milliLiter(s) IntraMuscular once  lactated ringers. 1000 milliLiter(s) (100 mL/Hr) IV Continuous <Continuous>  metroNIDAZOLE  IVPB 500 milliGRAM(s) IV Intermittent every 8 hours    MEDICATIONS  (PRN):  HYDROmorphone  Injectable 1 milliGRAM(s) IV Push every 3 hours PRN Severe Pain (7 - 10)  morphine  - Injectable 4 milliGRAM(s) IV Push every 4 hours PRN Moderate Pain (4 - 6)  morphine  - Injectable 2 milliGRAM(s) IV Push every 4 hours PRN Mild Pain (1 - 3)  ondansetron Injectable 4 milliGRAM(s) IV Push every 6 hours PRN Nausea    Vital Signs Last 24 Hrs  T(F): 98.8 (02 Dec 2020 05:25), Max: 99.3 (01 Dec 2020 20:33)  HR: 77 (02 Dec 2020 05:25) (77 - 93)  BP: 135/83 (02 Dec 2020 05:25) (124/80 - 136/83)  RR: 16 (02 Dec 2020 05:25) (15 - 17)  SpO2: 96% (02 Dec 2020 05:25) (93% - 96%)  I&O's Summary    01 Dec 2020 07:01  -  02 Dec 2020 07:00  --------------------------------------------------------  IN: 2650 mL / OUT: 3700 mL / NET: -1050 mL    PHYSICAL EXAM:  General: NAD, A/O x 3  ENT: MMM  Neck: Supple, No JVD  Abdomen: soft, nd, ttp, incision c/d/i, radha in place, staples in place, dressing changed  : pt voiding on his own, no spt  Extremities: No calf tenderness, No pitting edema    LABS:                        11.0   10.58 )-----------( 262      ( 02 Dec 2020 05:20 )             32.2     12-02    139  |  102  |  11  ----------------------------<  115  3.5   |  27  |  0.85    Ca    8.5      02 Dec 2020 05:20  Phos  2.0     12-02  Mg     2.0     12-02    TPro  6.3  /  Alb  2.6  /  TBili  1.2  /  DBili  x   /  AST  21  /  ALT  37  /  AlkPhos  61  12-02    eGFR if : 120 mL/min/1.73M2 (12-02-20 @ 05:20)  eGFR if Non African American: 103 mL/min/1.73M2 (12-02-20 @ 05:20)      Lactate, Blood: 1.0 mmol/L (12-02 @ 05:20)  Lactate, Blood: 3.4 mmol/L (11-30 @ 11:10)  Lactate, Blood: 3.7 mmol/L (11-30 @ 06:00)      Culture - Body Fluid with Gram Stain (collected 29 Nov 2020 18:45)  Source: .Body Fluid PERITONEAL FLUID  Gram Stain (30 Nov 2020 04:58):    Rare polymorphonuclear leukocytes seen per low power field    No organisms seen per oil power field  Preliminary Report (30 Nov 2020 16:52):    No growth

## 2020-12-02 NOTE — PROVIDER CONTACT NOTE (OTHER) - ASSESSMENT
Red marks of both sides of abdomen slightly raised. Pt denies it being itchy, states tape from dressing was there yesterday Red marks of both sides of abdomen slightly raised. Pt denies it being itchy, states tape from dressing was there yesterday.  NG tube coming out of R smith slightly,not at marked spot of tube for entry into smith

## 2020-12-03 LAB — SURGICAL PATHOLOGY STUDY: SIGNIFICANT CHANGE UP

## 2020-12-03 PROCEDURE — 99233 SBSQ HOSP IP/OBS HIGH 50: CPT

## 2020-12-03 RX ORDER — ONDANSETRON 8 MG/1
4 TABLET, FILM COATED ORAL EVERY 6 HOURS
Refills: 0 | Status: DISCONTINUED | OUTPATIENT
Start: 2020-12-03 | End: 2020-12-09

## 2020-12-03 RX ORDER — KETOROLAC TROMETHAMINE 30 MG/ML
15 SYRINGE (ML) INJECTION EVERY 6 HOURS
Refills: 0 | Status: DISCONTINUED | OUTPATIENT
Start: 2020-12-03 | End: 2020-12-04

## 2020-12-03 RX ORDER — ACETAMINOPHEN 500 MG
1000 TABLET ORAL ONCE
Refills: 0 | Status: COMPLETED | OUTPATIENT
Start: 2020-12-03 | End: 2020-12-03

## 2020-12-03 RX ORDER — OXYCODONE AND ACETAMINOPHEN 5; 325 MG/1; MG/1
1 TABLET ORAL EVERY 4 HOURS
Refills: 0 | Status: DISCONTINUED | OUTPATIENT
Start: 2020-12-03 | End: 2020-12-04

## 2020-12-03 RX ORDER — OXYCODONE AND ACETAMINOPHEN 5; 325 MG/1; MG/1
2 TABLET ORAL EVERY 4 HOURS
Refills: 0 | Status: DISCONTINUED | OUTPATIENT
Start: 2020-12-03 | End: 2020-12-03

## 2020-12-03 RX ORDER — BACITRACIN ZINC 500 UNIT/G
1 OINTMENT IN PACKET (EA) TOPICAL DAILY
Refills: 0 | Status: DISCONTINUED | OUTPATIENT
Start: 2020-12-03 | End: 2020-12-09

## 2020-12-03 RX ORDER — DIPHENHYDRAMINE HCL 50 MG
25 CAPSULE ORAL AT BEDTIME
Refills: 0 | Status: DISCONTINUED | OUTPATIENT
Start: 2020-12-03 | End: 2020-12-04

## 2020-12-03 RX ADMIN — Medication 15 MILLIGRAM(S): at 20:10

## 2020-12-03 RX ADMIN — Medication 200 MILLIGRAM(S): at 09:46

## 2020-12-03 RX ADMIN — Medication 100 MILLIGRAM(S): at 07:48

## 2020-12-03 RX ADMIN — HYDROMORPHONE HYDROCHLORIDE 1 MILLIGRAM(S): 2 INJECTION INTRAMUSCULAR; INTRAVENOUS; SUBCUTANEOUS at 04:47

## 2020-12-03 RX ADMIN — Medication 200 MILLIGRAM(S): at 22:24

## 2020-12-03 RX ADMIN — Medication 1 APPLICATION(S): at 12:46

## 2020-12-03 RX ADMIN — Medication 400 MILLIGRAM(S): at 23:57

## 2020-12-03 RX ADMIN — Medication 25 MILLIGRAM(S): at 22:26

## 2020-12-03 RX ADMIN — HYDROMORPHONE HYDROCHLORIDE 1 MILLIGRAM(S): 2 INJECTION INTRAMUSCULAR; INTRAVENOUS; SUBCUTANEOUS at 10:00

## 2020-12-03 RX ADMIN — ONDANSETRON 4 MILLIGRAM(S): 8 TABLET, FILM COATED ORAL at 16:34

## 2020-12-03 RX ADMIN — Medication 100 MILLIGRAM(S): at 00:29

## 2020-12-03 RX ADMIN — HYDROMORPHONE HYDROCHLORIDE 1 MILLIGRAM(S): 2 INJECTION INTRAMUSCULAR; INTRAVENOUS; SUBCUTANEOUS at 01:45

## 2020-12-03 RX ADMIN — Medication 100 MILLIGRAM(S): at 23:56

## 2020-12-03 RX ADMIN — HYDROMORPHONE HYDROCHLORIDE 1 MILLIGRAM(S): 2 INJECTION INTRAMUSCULAR; INTRAVENOUS; SUBCUTANEOUS at 02:00

## 2020-12-03 RX ADMIN — Medication 1 APPLICATION(S): at 04:48

## 2020-12-03 RX ADMIN — Medication 15 MILLIGRAM(S): at 21:10

## 2020-12-03 RX ADMIN — HYDROMORPHONE HYDROCHLORIDE 1 MILLIGRAM(S): 2 INJECTION INTRAMUSCULAR; INTRAVENOUS; SUBCUTANEOUS at 09:46

## 2020-12-03 RX ADMIN — HYDROMORPHONE HYDROCHLORIDE 1 MILLIGRAM(S): 2 INJECTION INTRAMUSCULAR; INTRAVENOUS; SUBCUTANEOUS at 16:45

## 2020-12-03 RX ADMIN — Medication 1 APPLICATION(S): at 22:24

## 2020-12-03 RX ADMIN — Medication 100 MILLIGRAM(S): at 16:33

## 2020-12-03 RX ADMIN — HYDROMORPHONE HYDROCHLORIDE 1 MILLIGRAM(S): 2 INJECTION INTRAMUSCULAR; INTRAVENOUS; SUBCUTANEOUS at 05:20

## 2020-12-03 RX ADMIN — HYDROMORPHONE HYDROCHLORIDE 1 MILLIGRAM(S): 2 INJECTION INTRAMUSCULAR; INTRAVENOUS; SUBCUTANEOUS at 16:34

## 2020-12-03 RX ADMIN — HYDROMORPHONE HYDROCHLORIDE 1 MILLIGRAM(S): 2 INJECTION INTRAMUSCULAR; INTRAVENOUS; SUBCUTANEOUS at 13:24

## 2020-12-03 RX ADMIN — HYDROMORPHONE HYDROCHLORIDE 1 MILLIGRAM(S): 2 INJECTION INTRAMUSCULAR; INTRAVENOUS; SUBCUTANEOUS at 13:40

## 2020-12-03 RX ADMIN — ENOXAPARIN SODIUM 40 MILLIGRAM(S): 100 INJECTION SUBCUTANEOUS at 12:46

## 2020-12-03 NOTE — PROGRESS NOTE ADULT - ASSESSMENT
50M hx of abdominal surgery as infant, , hx of SBO s/p ASHLEY 1991, hx of SBO in 4/2018 presented with abd pain, noted to have severe sepsis secondary to SBO with ischemic bowel s/p ex lap and small bowel resection    Sepsis sec to ischemic bowel with SBO s/p resection   Clears per Sx  pain control,, change to po meds  IVF  OOB and ambulate  Cipro/Flagyl  Benadryl prn for insomnia  Titrate oxygen

## 2020-12-03 NOTE — PROGRESS NOTE ADULT - SUBJECTIVE AND OBJECTIVE BOX
HPI:  50M hx of abdominal surgery at age 100 days old(unclear what type), hx of SBO s/p ASHLEY 1991, hx of SBO in 4/2018 resolved with conservative tmt pw abd pain. Abd pain started moderately around 5PM, was able to tolerate dinner, had self treated with PeptoBismol. Woke up around 1200 with worsening sxs and vomiting x 1,  self treated with [facility] with no benefit and came to ED. In ED, afebrile hemodynamically stable. WBC:10.9 AST:70. CTAP with high grade SBO.  (29 Nov 2020 05:31)      Subjective   Abd pain same. Had BM. NGT removed yesterday. Requesting sleeping med(takes Tylenol PM at home)        PAST MEDICAL & SURGICAL HISTORY:  No pertinent past medical history    SBO (small bowel obstruction)  s/p sx 1991    H/O abdominal surgery  at age 100 days        MedsMEDICATIONS  (STANDING):  BACItracin   Ointment 1 Application(s) Topical daily  ciprofloxacin   IVPB 400 milliGRAM(s) IV Intermittent every 12 hours  enoxaparin Injectable 40 milliGRAM(s) SubCutaneous daily  hydrocortisone 2.5% Ointment 1 Application(s) Topical two times a day  influenza   Vaccine 0.5 milliLiter(s) IntraMuscular once  lactated ringers. 1000 milliLiter(s) (100 mL/Hr) IV Continuous <Continuous>  metroNIDAZOLE  IVPB 500 milliGRAM(s) IV Intermittent every 8 hours    MEDICATIONS  (PRN):  diphenhydrAMINE 25 milliGRAM(s) Oral at bedtime PRN Insomnia  HYDROmorphone  Injectable 1 milliGRAM(s) IV Push every 3 hours PRN Severe Pain (7 - 10)  morphine  - Injectable 4 milliGRAM(s) IV Push every 4 hours PRN Moderate Pain (4 - 6)  morphine  - Injectable 2 milliGRAM(s) IV Push every 4 hours PRN Mild Pain (1 - 3)  ondansetron Injectable 4 milliGRAM(s) IV Push every 6 hours PRN Nausea      Vital Signs Last 24 Hrs  T(C): 36.6 (03 Dec 2020 09:45), Max: 37 (02 Dec 2020 15:56)  T(F): 97.9 (03 Dec 2020 09:45), Max: 98.6 (02 Dec 2020 15:56)  HR: 73 (03 Dec 2020 09:45) (72 - 83)  BP: 132/90 (03 Dec 2020 09:45) (127/85 - 138/88)  BP(mean): --  RR: 16 (03 Dec 2020 09:45) (15 - 17)  SpO2: 94% (03 Dec 2020 09:45) (92% - 97%)  I&O's Summary    02 Dec 2020 07:01  -  03 Dec 2020 07:00  --------------------------------------------------------  IN: 1600 mL / OUT: 1450 mL / NET: 150 mL        PHYSICAL EXAM:  GENERAL: NAD  NECK: Supple  NERVOUS SYSTEM:  awake and alert  HEART: S1s2 NL , RRR  CHEST/LUNG: Clear to percussion bilaterally  ABDOMEN: Pos BS, Pos tenderness, mildly distended/Hard.   EXTREMITIES:  No edema      LABS:(12-02 @ 05:20)                      11.0  10.58 )-----------( 262                 32.2    Neutrophils = 8.87 (83.8%)  Lymphocytes = 1.06 (10.0%)  Eosinophils = 0.02 (0.2%)  Basophils = 0.03 (0.3%)  Monocytes = 0.54 (5.1%)  Bands = --%    12-02    139  |  102  |  11  ----------------------------<  115<H>  3.5   |  27  |  0.85    Ca    8.5      02 Dec 2020 05:20  Phos  2.0     12-02  Mg     2.0     12-02    TPro  6.3  /  Alb  2.6<L>  /  TBili  1.2  /  DBili  x   /  AST  21  /  ALT  37  /  AlkPhos  61  12-02        CAPILLARY BLOOD GLUCOSE      POCT Blood Glucose.: 123 mg/dL (02 Dec 2020 17:11)      Imaging Personally Reviewed:  [ ] YES  [ ] NO        Care Discussed with Consultants/Other Providers [ x] YES  [ ] NO

## 2020-12-03 NOTE — PROGRESS NOTE ADULT - SUBJECTIVE AND OBJECTIVE BOX
Patient is a 50y old  Male who presents with a chief complaint of abd pain, SBO (03 Dec 2020 11:30)  pt POD # 4 s/p ex-lap, ASHLEY and bowel resection for SBO  pt was noted to be desaturating over night to low 90s and had nasal cannula placed, likely from Dilaudid ?  pt satting well this am, likely not taking deep breaths due to incisional pain,  pt feels well  + flatus, + loose stoolx2 yesterday  still c/o incisional pain, but denies n/v/f/c     Patient seen and examined at bedside    ALLERGIES:  No Known Allergies    MEDICATIONS  (STANDING):  BACItracin   Ointment 1 Application(s) Topical daily  ciprofloxacin   IVPB 400 milliGRAM(s) IV Intermittent every 12 hours  enoxaparin Injectable 40 milliGRAM(s) SubCutaneous daily  hydrocortisone 2.5% Ointment 1 Application(s) Topical two times a day  influenza   Vaccine 0.5 milliLiter(s) IntraMuscular once  lactated ringers. 1000 milliLiter(s) (100 mL/Hr) IV Continuous <Continuous>  metroNIDAZOLE  IVPB 500 milliGRAM(s) IV Intermittent every 8 hours    MEDICATIONS  (PRN):  diphenhydrAMINE 25 milliGRAM(s) Oral at bedtime PRN Insomnia  HYDROmorphone  Injectable 1 milliGRAM(s) IV Push every 3 hours PRN Severe Pain (7 - 10)  ondansetron Injectable 4 milliGRAM(s) IV Push every 6 hours PRN Nausea  oxycodone    5 mG/acetaminophen 325 mG 1 Tablet(s) Oral every 4 hours PRN Mild Pain (1 - 3)  oxycodone    5 mG/acetaminophen 325 mG 2 Tablet(s) Oral every 4 hours PRN Moderate Pain (4 - 6)    Vital Signs Last 24 Hrs  T(F): 97.9 (03 Dec 2020 09:45), Max: 98.6 (02 Dec 2020 15:56)  HR: 73 (03 Dec 2020 09:45) (72 - 83)  BP: 132/90 (03 Dec 2020 09:45) (127/85 - 138/88)  RR: 16 (03 Dec 2020 09:45) (15 - 17)  SpO2: 94% (03 Dec 2020 09:45) (92% - 97%)    I&O's Summary    02 Dec 2020 07:01  -  03 Dec 2020 07:00  --------------------------------------------------------  IN: 1600 mL / OUT: 1450 mL / NET: 150 mL    PHYSICAL EXAM:  General: NAD, no SOB, A/O x 3, pt seen and examined at bedside with Dr. Cordero  ENT: MMM  Neck: Supple, No JVD  Abdomen: Soft, nd, ttp, surgical radha removed, incision c/d/i, staples in place, new dressing applied, + tape rash on lateral abdomen  Extremities: No calf tenderness, No pitting edema    LABS:                        11.0   10.58 )-----------( 262      ( 02 Dec 2020 05:20 )             32.2     12-02    139  |  102  |  11  ----------------------------<  115  3.5   |  27  |  0.85    Ca    8.5      02 Dec 2020 05:20  Phos  2.0     12-02  Mg     2.0     12-02    TPro  6.3  /  Alb  2.6  /  TBili  1.2  /  DBili  x   /  AST  21  /  ALT  37  /  AlkPhos  61  12-02    eGFR if : 120 mL/min/1.73M2 (12-02-20 @ 05:20)  eGFR if Non African American: 103 mL/min/1.73M2 (12-02-20 @ 05:20)    Lactate, Blood: 1.0 mmol/L (12-02 @ 05:20)    POCT Blood Glucose.: 123 mg/dL (02 Dec 2020 17:11)    Culture - Body Fluid with Gram Stain (collected 29 Nov 2020 18:45)  Source: .Body Fluid PERITONEAL FLUID  Gram Stain (30 Nov 2020 04:58):    Rare polymorphonuclear leukocytes seen per low power field    No organisms seen per oil power field  Preliminary Report (30 Nov 2020 16:52):    No growth

## 2020-12-03 NOTE — PROGRESS NOTE ADULT - ASSESSMENT
Patient is a 50y old  Male who presents with a chief complaint of abd pain, SBO (03 Dec 2020 11:30)  pt POD # 4 s/p ex-lap, ASHLEY and bowel resection for SBO  pt was noted to be desaturating over night to low 90s and had nasal cannula placed, likely from Dilaudid ?  pt satting well this am, likely not taking deep breaths due to incisional pain,  pt feels well  + flatus, + loose stool x2 yesterday  still c/o incisional pain, but denies n/v/f/c     pt doing well from surgical standpoint    plan  - clear diet  - AMBULATE  - Bacitracin ointment to tape rash daily  - IV abx  - f/u am labs  - continuous pulse ox  - DVT prophylaxis  - IVF

## 2020-12-03 NOTE — PROGRESS NOTE ADULT - SUBJECTIVE AND OBJECTIVE BOX
Patient is a 50y old  Male who presents with a chief complaint of abd pain, SBO (03 Dec 2020 20:08)      BRIEF HOSPITAL COURSE:     50M POD #4 from ex-lapASHLEY SBR for SBO    Events last 24 hours:   -patient has NGT out, he states he became "a little" naueous after eating breakfast today but no vomitting  -he states he is passing stool/flatus    PAST MEDICAL & SURGICAL HISTORY:  No pertinent past medical history    SBO (small bowel obstruction)  s/p sx 1991    H/O abdominal surgery  at age 100 days        Review of Systems:  CONSTITUTIONAL: No fever, chills, or fatigue  EYES: No eye pain, visual disturbances, or discharge  ENMT:  No difficulty hearing, tinnitus, vertigo; No sinus or throat pain  NECK: No pain or stiffness  RESPIRATORY: No cough, wheezing, chills or hemoptysis; No shortness of breath  CARDIOVASCULAR: No chest pain, palpitations, dizziness, or leg swelling  GASTROINTESTINAL: No abdominal or epigastric pain. No nausea, vomiting, or hematemesis; No diarrhea or constipation. No melena or hematochezia.  GENITOURINARY: No dysuria, frequency, hematuria, or incontinence  NEUROLOGICAL: No headaches, memory loss, loss of strength, numbness, or tremors  SKIN: No itching, burning, rashes, or lesions   MUSCULOSKELETAL: No joint pain or swelling; No muscle, back, or extremity pain  PSYCHIATRIC: No depression, anxiety, mood swings, or difficulty sleeping      Medications:  ciprofloxacin   IVPB 400 milliGRAM(s) IV Intermittent every 12 hours  metroNIDAZOLE  IVPB 500 milliGRAM(s) IV Intermittent every 8 hours        acetaminophen  IVPB .. 1000 milliGRAM(s) IV Intermittent once  diphenhydrAMINE 25 milliGRAM(s) Oral at bedtime PRN  HYDROmorphone  Injectable 1 milliGRAM(s) IV Push every 3 hours PRN  ketorolac   Injectable 15 milliGRAM(s) IV Push every 6 hours PRN  ondansetron Injectable 4 milliGRAM(s) IV Push every 6 hours PRN  ondansetron Injectable 4 milliGRAM(s) IV Push every 6 hours PRN  oxycodone    5 mG/acetaminophen 325 mG 1 Tablet(s) Oral every 4 hours PRN      enoxaparin Injectable 40 milliGRAM(s) SubCutaneous daily          lactated ringers. 1000 milliLiter(s) IV Continuous <Continuous>    influenza   Vaccine 0.5 milliLiter(s) IntraMuscular once    BACItracin   Ointment 1 Application(s) Topical daily  hydrocortisone 2.5% Ointment 1 Application(s) Topical two times a day            ICU Vital Signs Last 24 Hrs  T(C): 36.8 (03 Dec 2020 19:56), Max: 36.9 (02 Dec 2020 23:24)  T(F): 98.3 (03 Dec 2020 19:56), Max: 98.4 (02 Dec 2020 23:24)  HR: 72 (03 Dec 2020 19:56) (71 - 82)  BP: 141/83 (03 Dec 2020 19:56) (127/85 - 141/83)  RR: 17 (03 Dec 2020 19:56) (15 - 17)  SpO2: 96% (03 Dec 2020 19:56) (93% - 97%)          I&O's Detail    02 Dec 2020 07:01  -  03 Dec 2020 07:00  --------------------------------------------------------  IN:    IV PiggyBack: 100 mL    IV PiggyBack: 200 mL    Lactated Ringers: 1300 mL  Total IN: 1600 mL    OUT:    Voided (mL): 1450 mL  Total OUT: 1450 mL    Total NET: 150 mL      03 Dec 2020 07:01  -  03 Dec 2020 22:06  --------------------------------------------------------  IN:  Total IN: 0 mL    OUT:    Voided (mL): 300 mL  Total OUT: 300 mL    Total NET: -300 mL            LABS:                        11.0   10.58 )-----------( 262      ( 02 Dec 2020 05:20 )             32.2     12-02    139  |  102  |  11  ----------------------------<  115<H>  3.5   |  27  |  0.85    Ca    8.5      02 Dec 2020 05:20  Phos  2.0     12-02  Mg     2.0     12-02    TPro  6.3  /  Alb  2.6<L>  /  TBili  1.2  /  DBili  x   /  AST  21  /  ALT  37  /  AlkPhos  61  12-02          CAPILLARY BLOOD GLUCOSE      POCT Blood Glucose.: 123 mg/dL (02 Dec 2020 17:11)        CULTURES:  Culture Results:   No growth (11-29-20 @ 18:45)      Physical Examination:    General: No acute distress.  Alert, oriented, interactive, nonfocal    HEENT: Pupils equal, reactive to light.  Symmetric.    PULM: Clear to auscultation bilaterally, no significant sputum production    CVS: Regular rate and rhythm, no murmurs, rubs, or gallops    ABD: Soft, nondistended, nontender, normoactive bowel sounds, no masses. Staples in place, CDI, no drainage    EXT: No edema, nontender    SKIN: Warm and well perfused, no rashes noted.

## 2020-12-03 NOTE — PROGRESS NOTE ADULT - ASSESSMENT
50M POD #4 from ex-lap, ASHLEY SBR for SBO    Events last 24 hours:   -patient has NGT out, he states he became "a little" naueous after eating breakfast today but no vomitting  -he states he is passing stool/flatus    PLAN;  -serial abd exams, any changes in exams will escalate to surgical team  -pain control, stool softener if narcotics needed  -advance diet as per surgical team  -continue IVFs until Po intake adequate  -will need to continue cipro flagyl for complete course post op  -AM labs

## 2020-12-04 LAB
ANION GAP SERPL CALC-SCNC: 10 MMOL/L — SIGNIFICANT CHANGE UP (ref 5–17)
ANION GAP SERPL CALC-SCNC: 9 MMOL/L — SIGNIFICANT CHANGE UP (ref 5–17)
BUN SERPL-MCNC: 10 MG/DL — SIGNIFICANT CHANGE UP (ref 7–23)
BUN SERPL-MCNC: 10 MG/DL — SIGNIFICANT CHANGE UP (ref 7–23)
CALCIUM SERPL-MCNC: 8.2 MG/DL — LOW (ref 8.4–10.5)
CALCIUM SERPL-MCNC: 8.3 MG/DL — LOW (ref 8.4–10.5)
CHLORIDE SERPL-SCNC: 101 MMOL/L — SIGNIFICANT CHANGE UP (ref 96–108)
CHLORIDE SERPL-SCNC: 101 MMOL/L — SIGNIFICANT CHANGE UP (ref 96–108)
CO2 SERPL-SCNC: 25 MMOL/L — SIGNIFICANT CHANGE UP (ref 22–31)
CO2 SERPL-SCNC: 26 MMOL/L — SIGNIFICANT CHANGE UP (ref 22–31)
CREAT SERPL-MCNC: 0.76 MG/DL — SIGNIFICANT CHANGE UP (ref 0.5–1.3)
CREAT SERPL-MCNC: 0.81 MG/DL — SIGNIFICANT CHANGE UP (ref 0.5–1.3)
CULTURE RESULTS: SIGNIFICANT CHANGE UP
GLUCOSE BLDC GLUCOMTR-MCNC: 127 MG/DL — HIGH (ref 70–99)
GLUCOSE SERPL-MCNC: 119 MG/DL — HIGH (ref 70–99)
GLUCOSE SERPL-MCNC: 130 MG/DL — HIGH (ref 70–99)
HCT VFR BLD CALC: 32.2 % — LOW (ref 39–50)
HCT VFR BLD CALC: 32.3 % — LOW (ref 39–50)
HGB BLD-MCNC: 10.9 G/DL — LOW (ref 13–17)
HGB BLD-MCNC: 11.3 G/DL — LOW (ref 13–17)
LACTATE SERPL-SCNC: 1 MMOL/L — SIGNIFICANT CHANGE UP (ref 0.7–2)
MAGNESIUM SERPL-MCNC: 2 MG/DL — SIGNIFICANT CHANGE UP (ref 1.6–2.6)
MCHC RBC-ENTMCNC: 30.8 PG — SIGNIFICANT CHANGE UP (ref 27–34)
MCHC RBC-ENTMCNC: 32.2 PG — SIGNIFICANT CHANGE UP (ref 27–34)
MCHC RBC-ENTMCNC: 33.7 GM/DL — SIGNIFICANT CHANGE UP (ref 32–36)
MCHC RBC-ENTMCNC: 35.1 GM/DL — SIGNIFICANT CHANGE UP (ref 32–36)
MCV RBC AUTO: 91.2 FL — SIGNIFICANT CHANGE UP (ref 80–100)
MCV RBC AUTO: 91.7 FL — SIGNIFICANT CHANGE UP (ref 80–100)
NRBC # BLD: 0 /100 WBCS — SIGNIFICANT CHANGE UP (ref 0–0)
NRBC # BLD: 0 /100 WBCS — SIGNIFICANT CHANGE UP (ref 0–0)
PHOSPHATE SERPL-MCNC: 3.6 MG/DL — SIGNIFICANT CHANGE UP (ref 2.5–4.5)
PLATELET # BLD AUTO: 329 K/UL — SIGNIFICANT CHANGE UP (ref 150–400)
PLATELET # BLD AUTO: 359 K/UL — SIGNIFICANT CHANGE UP (ref 150–400)
POTASSIUM SERPL-MCNC: 3.2 MMOL/L — LOW (ref 3.5–5.3)
POTASSIUM SERPL-MCNC: 3.5 MMOL/L — SIGNIFICANT CHANGE UP (ref 3.5–5.3)
POTASSIUM SERPL-SCNC: 3.2 MMOL/L — LOW (ref 3.5–5.3)
POTASSIUM SERPL-SCNC: 3.5 MMOL/L — SIGNIFICANT CHANGE UP (ref 3.5–5.3)
RBC # BLD: 3.51 M/UL — LOW (ref 4.2–5.8)
RBC # BLD: 3.54 M/UL — LOW (ref 4.2–5.8)
RBC # FLD: 12.1 % — SIGNIFICANT CHANGE UP (ref 10.3–14.5)
RBC # FLD: 12.2 % — SIGNIFICANT CHANGE UP (ref 10.3–14.5)
SODIUM SERPL-SCNC: 136 MMOL/L — SIGNIFICANT CHANGE UP (ref 135–145)
SODIUM SERPL-SCNC: 136 MMOL/L — SIGNIFICANT CHANGE UP (ref 135–145)
SPECIMEN SOURCE: SIGNIFICANT CHANGE UP
WBC # BLD: 10.3 K/UL — SIGNIFICANT CHANGE UP (ref 3.8–10.5)
WBC # BLD: 9.18 K/UL — SIGNIFICANT CHANGE UP (ref 3.8–10.5)
WBC # FLD AUTO: 10.3 K/UL — SIGNIFICANT CHANGE UP (ref 3.8–10.5)
WBC # FLD AUTO: 9.18 K/UL — SIGNIFICANT CHANGE UP (ref 3.8–10.5)

## 2020-12-04 PROCEDURE — 99233 SBSQ HOSP IP/OBS HIGH 50: CPT

## 2020-12-04 PROCEDURE — 74018 RADEX ABDOMEN 1 VIEW: CPT | Mod: 26

## 2020-12-04 RX ORDER — POTASSIUM CHLORIDE 20 MEQ
20 PACKET (EA) ORAL ONCE
Refills: 0 | Status: COMPLETED | OUTPATIENT
Start: 2020-12-04 | End: 2020-12-04

## 2020-12-04 RX ORDER — HYDROMORPHONE HYDROCHLORIDE 2 MG/ML
1 INJECTION INTRAMUSCULAR; INTRAVENOUS; SUBCUTANEOUS
Refills: 0 | Status: DISCONTINUED | OUTPATIENT
Start: 2020-12-04 | End: 2020-12-08

## 2020-12-04 RX ORDER — HYDROMORPHONE HYDROCHLORIDE 2 MG/ML
0.5 INJECTION INTRAMUSCULAR; INTRAVENOUS; SUBCUTANEOUS
Refills: 0 | Status: DISCONTINUED | OUTPATIENT
Start: 2020-12-04 | End: 2020-12-08

## 2020-12-04 RX ORDER — PIPERACILLIN AND TAZOBACTAM 4; .5 G/20ML; G/20ML
3.38 INJECTION, POWDER, LYOPHILIZED, FOR SOLUTION INTRAVENOUS EVERY 8 HOURS
Refills: 0 | Status: DISCONTINUED | OUTPATIENT
Start: 2020-12-04 | End: 2020-12-09

## 2020-12-04 RX ORDER — POTASSIUM CHLORIDE 20 MEQ
10 PACKET (EA) ORAL
Refills: 0 | Status: COMPLETED | OUTPATIENT
Start: 2020-12-04 | End: 2020-12-04

## 2020-12-04 RX ADMIN — Medication 100 MILLIEQUIVALENT(S): at 04:19

## 2020-12-04 RX ADMIN — Medication 20 MILLIEQUIVALENT(S): at 04:19

## 2020-12-04 RX ADMIN — HYDROMORPHONE HYDROCHLORIDE 1 MILLIGRAM(S): 2 INJECTION INTRAMUSCULAR; INTRAVENOUS; SUBCUTANEOUS at 21:17

## 2020-12-04 RX ADMIN — Medication 100 MILLIEQUIVALENT(S): at 05:44

## 2020-12-04 RX ADMIN — Medication 200 MILLIGRAM(S): at 08:37

## 2020-12-04 RX ADMIN — HYDROMORPHONE HYDROCHLORIDE 1 MILLIGRAM(S): 2 INJECTION INTRAMUSCULAR; INTRAVENOUS; SUBCUTANEOUS at 05:43

## 2020-12-04 RX ADMIN — HYDROMORPHONE HYDROCHLORIDE 1 MILLIGRAM(S): 2 INJECTION INTRAMUSCULAR; INTRAVENOUS; SUBCUTANEOUS at 09:24

## 2020-12-04 RX ADMIN — Medication 15 MILLIGRAM(S): at 02:12

## 2020-12-04 RX ADMIN — Medication 15 MILLIGRAM(S): at 02:30

## 2020-12-04 RX ADMIN — HYDROMORPHONE HYDROCHLORIDE 1 MILLIGRAM(S): 2 INJECTION INTRAMUSCULAR; INTRAVENOUS; SUBCUTANEOUS at 09:11

## 2020-12-04 RX ADMIN — ENOXAPARIN SODIUM 40 MILLIGRAM(S): 100 INJECTION SUBCUTANEOUS at 12:53

## 2020-12-04 RX ADMIN — HYDROMORPHONE HYDROCHLORIDE 1 MILLIGRAM(S): 2 INJECTION INTRAMUSCULAR; INTRAVENOUS; SUBCUTANEOUS at 18:39

## 2020-12-04 RX ADMIN — HYDROMORPHONE HYDROCHLORIDE 1 MILLIGRAM(S): 2 INJECTION INTRAMUSCULAR; INTRAVENOUS; SUBCUTANEOUS at 16:45

## 2020-12-04 RX ADMIN — HYDROMORPHONE HYDROCHLORIDE 1 MILLIGRAM(S): 2 INJECTION INTRAMUSCULAR; INTRAVENOUS; SUBCUTANEOUS at 21:35

## 2020-12-04 RX ADMIN — HYDROMORPHONE HYDROCHLORIDE 1 MILLIGRAM(S): 2 INJECTION INTRAMUSCULAR; INTRAVENOUS; SUBCUTANEOUS at 13:20

## 2020-12-04 RX ADMIN — HYDROMORPHONE HYDROCHLORIDE 1 MILLIGRAM(S): 2 INJECTION INTRAMUSCULAR; INTRAVENOUS; SUBCUTANEOUS at 13:01

## 2020-12-04 RX ADMIN — Medication 100 MILLIGRAM(S): at 07:37

## 2020-12-04 RX ADMIN — Medication 100 MILLIEQUIVALENT(S): at 06:58

## 2020-12-04 RX ADMIN — SODIUM CHLORIDE 100 MILLILITER(S): 9 INJECTION, SOLUTION INTRAVENOUS at 14:33

## 2020-12-04 RX ADMIN — PIPERACILLIN AND TAZOBACTAM 25 GRAM(S): 4; .5 INJECTION, POWDER, LYOPHILIZED, FOR SOLUTION INTRAVENOUS at 15:00

## 2020-12-04 RX ADMIN — PIPERACILLIN AND TAZOBACTAM 25 GRAM(S): 4; .5 INJECTION, POWDER, LYOPHILIZED, FOR SOLUTION INTRAVENOUS at 22:38

## 2020-12-04 RX ADMIN — HYDROMORPHONE HYDROCHLORIDE 1 MILLIGRAM(S): 2 INJECTION INTRAMUSCULAR; INTRAVENOUS; SUBCUTANEOUS at 18:55

## 2020-12-04 RX ADMIN — Medication 1000 MILLIGRAM(S): at 00:15

## 2020-12-04 RX ADMIN — HYDROMORPHONE HYDROCHLORIDE 1 MILLIGRAM(S): 2 INJECTION INTRAMUSCULAR; INTRAVENOUS; SUBCUTANEOUS at 06:00

## 2020-12-04 RX ADMIN — Medication 1 APPLICATION(S): at 12:54

## 2020-12-04 RX ADMIN — HYDROMORPHONE HYDROCHLORIDE 1 MILLIGRAM(S): 2 INJECTION INTRAMUSCULAR; INTRAVENOUS; SUBCUTANEOUS at 16:30

## 2020-12-04 NOTE — PROGRESS NOTE ADULT - ASSESSMENT
50M hx of abdominal surgery as infant, , hx of SBO s/p ASHLEY 1991, hx of SBO in 4/2018 presented with abd pain, noted to have severe sepsis secondary to SBO with ischemic bowel s/p ex lap and small bowel resection    Sepsis sec to ischemic bowel with SBO s/p resection   Clears per Sx  changed pain meds  IVF  OOB and ambulate  DC cipro/flagyl and start zosyn  Titrate oxygen

## 2020-12-04 NOTE — CHART NOTE - NSCHARTNOTEFT_GEN_A_CORE
-abdominal xray being read as "small bowel obstruction"  -however on my exam/assessment tonight, patient was eating rice pudding, and he was passing stool/flatus. Although pain remains high. Exam unchanged, slight ditenstion, pain is NOT out of proportion  -lactate sent was negative      _discussed these findings with Dr. villeda, who is aware, and stated that tehre was multiple and numerous parts of the small bowel still stuck together, and tehre was no furteha cute inteventions. HE was more concerned with signs of intra abdominalf ree air with serial xrays.  -this XRAY states that ther is NO free air.    -will CTM

## 2020-12-04 NOTE — PROGRESS NOTE ADULT - SUBJECTIVE AND OBJECTIVE BOX
HPI:  50M hx of abdominal surgery at 100 days ago (unclear what type), hx of SBO s/p ASHLEY 1991, hx of SBO in 4/2018 resolved with conservative tmt pw abd pain. Abd pain started moderately around 5PM, was able to tolerate dinner, had self treated with PeptoBismol. Woke up around 1200 with worsening sxs and vomiting x 1,  self treated with Faby [place] with no benefit and came to ED. In ED, afebrile hemodynamically stable. WBC:10.9 AST:70. CTAP with high grade SBO.  (29 Nov 2020 05:31)      Subjective  Pos diaphoresis/generalized myalgias.  could not sleep last night.   Pos abd pain.  Also c/o being sensitive to smell  Pos nausea      PAST MEDICAL & SURGICAL HISTORY:  No pertinent past medical history    SBO (small bowel obstruction)  s/p sx 1991    H/O abdominal surgery  at age 100 days        MedsMEDICATIONS  (STANDING):  BACItracin   Ointment 1 Application(s) Topical daily  enoxaparin Injectable 40 milliGRAM(s) SubCutaneous daily  hydrocortisone 2.5% Ointment 1 Application(s) Topical two times a day  influenza   Vaccine 0.5 milliLiter(s) IntraMuscular once  lactated ringers. 1000 milliLiter(s) (100 mL/Hr) IV Continuous <Continuous>  piperacillin/tazobactam IVPB.. 3.375 Gram(s) IV Intermittent every 8 hours    MEDICATIONS  (PRN):  HYDROmorphone  Injectable 1 milliGRAM(s) IV Push every 2 hours PRN Severe Pain (7 - 10)  HYDROmorphone  Injectable 0.5 milliGRAM(s) IV Push every 2 hours PRN Moderate Pain (4 - 6)  ondansetron Injectable 4 milliGRAM(s) IV Push every 6 hours PRN Nausea  ondansetron Injectable 4 milliGRAM(s) IV Push every 6 hours PRN Nausea and/or Vomiting      Vital Signs Last 24 Hrs  T(C): 36.7 (04 Dec 2020 08:00), Max: 36.8 (03 Dec 2020 16:13)  T(F): 98.1 (04 Dec 2020 08:00), Max: 98.3 (03 Dec 2020 19:56)  HR: 64 (04 Dec 2020 08:00) (64 - 74)  BP: 136/82 (04 Dec 2020 08:00) (134/89 - 141/83)  BP(mean): --  RR: 17 (04 Dec 2020 08:00) (16 - 18)  SpO2: 94% (04 Dec 2020 08:00) (94% - 97%)  I&O's Summary    03 Dec 2020 07:01  -  04 Dec 2020 07:00  --------------------------------------------------------  IN: 200 mL / OUT: 304 mL / NET: -104 mL    04 Dec 2020 07:01  -  04 Dec 2020 13:45  --------------------------------------------------------  IN: 200 mL / OUT: 0 mL / NET: 200 mL        PHYSICAL EXAM:  GENERAL: NAD  NECK: Supple  NERVOUS SYSTEM:  awake and alert  HEART: S1s2 NL , RRR  CHEST/LUNG: Clear to percussion bilaterally  ABDOMEN: POs BS, firm, mildly distended, diffuse tenderness  EXTREMITIES:  No edema      LABS:(12-04 @ 06:00)                      11.3  10.30 )-----------( 359                 32.2    Neutrophils = -- (--%)  Lymphocytes = -- (--%)  Eosinophils = -- (--%)  Basophils = -- (--%)  Monocytes = -- (--%)  Bands = --%    12-04    136  |  101  |  10  ----------------------------<  119<H>  3.5   |  26  |  0.76    Ca    8.2<L>      04 Dec 2020 06:00  Phos  3.6     12-04  Mg     2.0     12-04        CAPILLARY BLOOD GLUCOSE      POCT Blood Glucose.: 127 mg/dL (04 Dec 2020 02:22)      Imaging Personally Reviewed:  [ ] YES  [ ] NO        Care Discussed with Consultants/Other Providers [ x] YES  [ ] NO

## 2020-12-04 NOTE — CHART NOTE - NSCHARTNOTEFT_GEN_A_CORE
Nutrition Follow Up Note  Hospital Course (Per Electronic Medical Record):   Source: Medical Record [X]  MD [X]  Nursing Staff [X]     Diet: Clear fluids     Patient POD # 5, tolerating clear fluids at present ~50% consumed , pain noted .     Current Weight: (12/3) 196.2/89kg , weight  stable , ? admit weight due to recorded follow up weights.     Pertinent Medications: MEDICATIONS  (STANDING):  BACItracin   Ointment 1 Application(s) Topical daily  ciprofloxacin   IVPB 400 milliGRAM(s) IV Intermittent every 12 hours  enoxaparin Injectable 40 milliGRAM(s) SubCutaneous daily  hydrocortisone 2.5% Ointment 1 Application(s) Topical two times a day  influenza   Vaccine 0.5 milliLiter(s) IntraMuscular once  lactated ringers. 1000 milliLiter(s) (100 mL/Hr) IV Continuous <Continuous>  metroNIDAZOLE  IVPB 500 milliGRAM(s) IV Intermittent every 8 hours    MEDICATIONS  (PRN):  diphenhydrAMINE 25 milliGRAM(s) Oral at bedtime PRN Insomnia  HYDROmorphone  Injectable 1 milliGRAM(s) IV Push every 3 hours PRN Severe Pain (7 - 10)  ketorolac   Injectable 15 milliGRAM(s) IV Push every 6 hours PRN Moderate Pain (4 - 6)  ondansetron Injectable 4 milliGRAM(s) IV Push every 6 hours PRN Nausea  ondansetron Injectable 4 milliGRAM(s) IV Push every 6 hours PRN Nausea and/or Vomiting  oxycodone    5 mG/acetaminophen 325 mG 1 Tablet(s) Oral every 4 hours PRN Mild Pain (1 - 3)      Pertinent Labs:  12-04 Na136 mmol/L Glu 119 mg/dL<H> K+ 3.5 mmol/L Cr  0.76 mg/dL BUN 10 mg/dL 12-04 Phos 3.6 mg/dL 12-02 Alb 2.6 g/dL<L>        Skin: intact     Edema: none     Last BM: on (12/3) (+) diarrhea    Estimated Needs:   [X] No Change since Previous Assessment      Previous Nutrition Diagnosis: Altered GI status     Nutrition Diagnosis is [X] Ongoing,          New Nutrition Diagnosis: [X] Not Applicable      Interventions:   1. Recommend advance diet to low fiber as medically indicated       Monitoring & Evaluation: will monitor:  [X] Weights   [X] Monitor clear fluid status   [X] Follow Up (Per Protocol)      RD to follow as per Nutrition protocol  [place] Edgar, KIRKN

## 2020-12-04 NOTE — CHART NOTE - NSCHARTNOTEFT_GEN_A_CORE
F/U Note: Patient S/P EX-lap, ASHLEY, and SBR for SBO    Events:  -called by patient's bedside RN as he told her he "didn't feel right." With elaboration from patient this meant he "felt hot and cold" at the same time, nauseous    -decided to send AM LABS as STAT    labs revealed hypokalemia to 3.2                            10.9   9.18  )-----------( 329      ( 04 Dec 2020 02:50 )             32.3       12-04    136  |  101  |  10  ----------------------------<  130<H>  3.2<L>   |  25  |  0.81    Ca    8.3<L>      04 Dec 2020 02:50  Phos  3.6     12-04  Mg     2.0     12-04      On interview apteitn confirmed he "felt hot and cold at the same time, and his stomach hurt"  on exam, abd is unchanged from beginnig of shift, soft, non-tedner to palp, no rainage from surgical staple line    -on further discussion with bedside RN, it was noted that prior to last night's shift he had been receiving dilaudid around the clock, basically had received it everytime it was due as a PRN.  -during the day yesterday and overniht he did not recieive any.    -there may be some small component of mild withdrawl symtpoms ) asnarcotic withdrawl can manifest with chills and stomach pain_ as the remainder of exam is unchanged and labs with only low K+.      PLAN;    -patient's K+ will be repleted with IV and Po forulations for a totaol of 50 MEQs, with repeat labs to be done when complete  - may consider smaller doses of narcotic or longer intervals between to mitigate possible withdrawl  -conmtinue serial abd exams  -check repeat BMP when repletion complete

## 2020-12-04 NOTE — PROGRESS NOTE ADULT - SUBJECTIVE AND OBJECTIVE BOX
POD #5    Remains afebrile; Wbc - 13.24  Complains that he is feeling cold for the past 12 hours - no shaking chills  States that he is experiencing more abdominal pain today  Passing frequent loose stools  Denies nausea or vomiting    ABD - more firm than yesterday           wound clean  - radha removed yesterday    IMPRESSION: Ileus    PLAN: Continue PO fluids            Monitor K+ (as diarrhea continues)            Manage analgesics

## 2020-12-05 LAB
ALBUMIN SERPL ELPH-MCNC: 2.8 G/DL — LOW (ref 3.3–5)
ALP SERPL-CCNC: 73 U/L — SIGNIFICANT CHANGE UP (ref 40–120)
ALT FLD-CCNC: 43 U/L — SIGNIFICANT CHANGE UP (ref 10–45)
ANION GAP SERPL CALC-SCNC: 10 MMOL/L — SIGNIFICANT CHANGE UP (ref 5–17)
AST SERPL-CCNC: 26 U/L — SIGNIFICANT CHANGE UP (ref 10–40)
BASOPHILS # BLD AUTO: 0.04 K/UL — SIGNIFICANT CHANGE UP (ref 0–0.2)
BASOPHILS NFR BLD AUTO: 0.3 % — SIGNIFICANT CHANGE UP (ref 0–2)
BILIRUB SERPL-MCNC: 0.6 MG/DL — SIGNIFICANT CHANGE UP (ref 0.2–1.2)
BUN SERPL-MCNC: 7 MG/DL — SIGNIFICANT CHANGE UP (ref 7–23)
CALCIUM SERPL-MCNC: 8.7 MG/DL — SIGNIFICANT CHANGE UP (ref 8.4–10.5)
CHLORIDE SERPL-SCNC: 100 MMOL/L — SIGNIFICANT CHANGE UP (ref 96–108)
CO2 SERPL-SCNC: 27 MMOL/L — SIGNIFICANT CHANGE UP (ref 22–31)
CREAT SERPL-MCNC: 0.82 MG/DL — SIGNIFICANT CHANGE UP (ref 0.5–1.3)
EOSINOPHIL # BLD AUTO: 0.26 K/UL — SIGNIFICANT CHANGE UP (ref 0–0.5)
EOSINOPHIL NFR BLD AUTO: 2.1 % — SIGNIFICANT CHANGE UP (ref 0–6)
GLUCOSE SERPL-MCNC: 118 MG/DL — HIGH (ref 70–99)
HCT VFR BLD CALC: 34.5 % — LOW (ref 39–50)
HGB BLD-MCNC: 11.8 G/DL — LOW (ref 13–17)
IMM GRANULOCYTES NFR BLD AUTO: 2.6 % — HIGH (ref 0–1.5)
LYMPHOCYTES # BLD AUTO: 1.9 K/UL — SIGNIFICANT CHANGE UP (ref 1–3.3)
LYMPHOCYTES # BLD AUTO: 15.4 % — SIGNIFICANT CHANGE UP (ref 13–44)
MAGNESIUM SERPL-MCNC: 2.1 MG/DL — SIGNIFICANT CHANGE UP (ref 1.6–2.6)
MCHC RBC-ENTMCNC: 31.1 PG — SIGNIFICANT CHANGE UP (ref 27–34)
MCHC RBC-ENTMCNC: 34.2 GM/DL — SIGNIFICANT CHANGE UP (ref 32–36)
MCV RBC AUTO: 91 FL — SIGNIFICANT CHANGE UP (ref 80–100)
MONOCYTES # BLD AUTO: 0.89 K/UL — SIGNIFICANT CHANGE UP (ref 0–0.9)
MONOCYTES NFR BLD AUTO: 7.2 % — SIGNIFICANT CHANGE UP (ref 2–14)
NEUTROPHILS # BLD AUTO: 8.91 K/UL — HIGH (ref 1.8–7.4)
NEUTROPHILS NFR BLD AUTO: 72.4 % — SIGNIFICANT CHANGE UP (ref 43–77)
NRBC # BLD: 0 /100 WBCS — SIGNIFICANT CHANGE UP (ref 0–0)
PHOSPHATE SERPL-MCNC: 3.3 MG/DL — SIGNIFICANT CHANGE UP (ref 2.5–4.5)
PLATELET # BLD AUTO: 434 K/UL — HIGH (ref 150–400)
POTASSIUM SERPL-MCNC: 3.6 MMOL/L — SIGNIFICANT CHANGE UP (ref 3.5–5.3)
POTASSIUM SERPL-SCNC: 3.6 MMOL/L — SIGNIFICANT CHANGE UP (ref 3.5–5.3)
PROT SERPL-MCNC: 6.6 G/DL — SIGNIFICANT CHANGE UP (ref 6–8.3)
RBC # BLD: 3.79 M/UL — LOW (ref 4.2–5.8)
RBC # FLD: 12.4 % — SIGNIFICANT CHANGE UP (ref 10.3–14.5)
SODIUM SERPL-SCNC: 137 MMOL/L — SIGNIFICANT CHANGE UP (ref 135–145)
WBC # BLD: 12.32 K/UL — HIGH (ref 3.8–10.5)
WBC # FLD AUTO: 12.32 K/UL — HIGH (ref 3.8–10.5)

## 2020-12-05 PROCEDURE — 99233 SBSQ HOSP IP/OBS HIGH 50: CPT

## 2020-12-05 RX ADMIN — HYDROMORPHONE HYDROCHLORIDE 0.5 MILLIGRAM(S): 2 INJECTION INTRAMUSCULAR; INTRAVENOUS; SUBCUTANEOUS at 15:42

## 2020-12-05 RX ADMIN — PIPERACILLIN AND TAZOBACTAM 25 GRAM(S): 4; .5 INJECTION, POWDER, LYOPHILIZED, FOR SOLUTION INTRAVENOUS at 05:32

## 2020-12-05 RX ADMIN — HYDROMORPHONE HYDROCHLORIDE 0.5 MILLIGRAM(S): 2 INJECTION INTRAMUSCULAR; INTRAVENOUS; SUBCUTANEOUS at 12:50

## 2020-12-05 RX ADMIN — HYDROMORPHONE HYDROCHLORIDE 0.5 MILLIGRAM(S): 2 INJECTION INTRAMUSCULAR; INTRAVENOUS; SUBCUTANEOUS at 18:35

## 2020-12-05 RX ADMIN — ONDANSETRON 4 MILLIGRAM(S): 8 TABLET, FILM COATED ORAL at 09:03

## 2020-12-05 RX ADMIN — HYDROMORPHONE HYDROCHLORIDE 1 MILLIGRAM(S): 2 INJECTION INTRAMUSCULAR; INTRAVENOUS; SUBCUTANEOUS at 00:26

## 2020-12-05 RX ADMIN — HYDROMORPHONE HYDROCHLORIDE 1 MILLIGRAM(S): 2 INJECTION INTRAMUSCULAR; INTRAVENOUS; SUBCUTANEOUS at 00:45

## 2020-12-05 RX ADMIN — ENOXAPARIN SODIUM 40 MILLIGRAM(S): 100 INJECTION SUBCUTANEOUS at 12:34

## 2020-12-05 RX ADMIN — HYDROMORPHONE HYDROCHLORIDE 0.5 MILLIGRAM(S): 2 INJECTION INTRAMUSCULAR; INTRAVENOUS; SUBCUTANEOUS at 18:50

## 2020-12-05 RX ADMIN — HYDROMORPHONE HYDROCHLORIDE 0.5 MILLIGRAM(S): 2 INJECTION INTRAMUSCULAR; INTRAVENOUS; SUBCUTANEOUS at 09:11

## 2020-12-05 RX ADMIN — HYDROMORPHONE HYDROCHLORIDE 0.5 MILLIGRAM(S): 2 INJECTION INTRAMUSCULAR; INTRAVENOUS; SUBCUTANEOUS at 23:05

## 2020-12-05 RX ADMIN — HYDROMORPHONE HYDROCHLORIDE 0.5 MILLIGRAM(S): 2 INJECTION INTRAMUSCULAR; INTRAVENOUS; SUBCUTANEOUS at 22:50

## 2020-12-05 RX ADMIN — HYDROMORPHONE HYDROCHLORIDE 0.5 MILLIGRAM(S): 2 INJECTION INTRAMUSCULAR; INTRAVENOUS; SUBCUTANEOUS at 05:44

## 2020-12-05 RX ADMIN — HYDROMORPHONE HYDROCHLORIDE 1 MILLIGRAM(S): 2 INJECTION INTRAMUSCULAR; INTRAVENOUS; SUBCUTANEOUS at 21:00

## 2020-12-05 RX ADMIN — ONDANSETRON 4 MILLIGRAM(S): 8 TABLET, FILM COATED ORAL at 19:47

## 2020-12-05 RX ADMIN — HYDROMORPHONE HYDROCHLORIDE 0.5 MILLIGRAM(S): 2 INJECTION INTRAMUSCULAR; INTRAVENOUS; SUBCUTANEOUS at 12:35

## 2020-12-05 RX ADMIN — Medication 1 APPLICATION(S): at 17:24

## 2020-12-05 RX ADMIN — SODIUM CHLORIDE 100 MILLILITER(S): 9 INJECTION, SOLUTION INTRAVENOUS at 22:09

## 2020-12-05 RX ADMIN — HYDROMORPHONE HYDROCHLORIDE 0.5 MILLIGRAM(S): 2 INJECTION INTRAMUSCULAR; INTRAVENOUS; SUBCUTANEOUS at 15:27

## 2020-12-05 RX ADMIN — HYDROMORPHONE HYDROCHLORIDE 1 MILLIGRAM(S): 2 INJECTION INTRAMUSCULAR; INTRAVENOUS; SUBCUTANEOUS at 03:35

## 2020-12-05 RX ADMIN — Medication 1 APPLICATION(S): at 12:35

## 2020-12-05 RX ADMIN — PIPERACILLIN AND TAZOBACTAM 25 GRAM(S): 4; .5 INJECTION, POWDER, LYOPHILIZED, FOR SOLUTION INTRAVENOUS at 22:08

## 2020-12-05 RX ADMIN — HYDROMORPHONE HYDROCHLORIDE 0.5 MILLIGRAM(S): 2 INJECTION INTRAMUSCULAR; INTRAVENOUS; SUBCUTANEOUS at 06:10

## 2020-12-05 RX ADMIN — HYDROMORPHONE HYDROCHLORIDE 1 MILLIGRAM(S): 2 INJECTION INTRAMUSCULAR; INTRAVENOUS; SUBCUTANEOUS at 20:44

## 2020-12-05 RX ADMIN — HYDROMORPHONE HYDROCHLORIDE 0.5 MILLIGRAM(S): 2 INJECTION INTRAMUSCULAR; INTRAVENOUS; SUBCUTANEOUS at 08:56

## 2020-12-05 RX ADMIN — HYDROMORPHONE HYDROCHLORIDE 1 MILLIGRAM(S): 2 INJECTION INTRAMUSCULAR; INTRAVENOUS; SUBCUTANEOUS at 03:19

## 2020-12-05 RX ADMIN — PIPERACILLIN AND TAZOBACTAM 25 GRAM(S): 4; .5 INJECTION, POWDER, LYOPHILIZED, FOR SOLUTION INTRAVENOUS at 15:28

## 2020-12-05 NOTE — PROGRESS NOTE ADULT - SUBJECTIVE AND OBJECTIVE BOX
Patient is a 50y old  Male who presents with a chief complaint of abd pain, SBO (04 Dec 2020 13:44)      BRIEF HOSPITAL COURSE:     50M POD# 6 from EX-lapASHLEY SBR for SBO    Events last 24 hours:   -patient with one episode of vomitting overnight, small amount, green in color. Patient stated he felt much better after vomitting.  -please see chart note from 12/04/2020 for further info regarding abd xray and discussion with surgeon  -abd exam remains unchanged at this time    PAST MEDICAL & SURGICAL HISTORY:  No pertinent past medical history    SBO (small bowel obstruction)  s/p sx 1991    H/O abdominal surgery  at age 100 days        Review of Systems:  CONSTITUTIONAL: No fever, chills, or fatigue  EYES: No eye pain, visual disturbances, or discharge  ENMT:  No difficulty hearing, tinnitus, vertigo; No sinus or throat pain  NECK: No pain or stiffness  RESPIRATORY: No cough, wheezing, chills or hemoptysis; No shortness of breath  CARDIOVASCULAR: No chest pain, palpitations, dizziness, or leg swelling  GASTROINTESTINAL: (+) Nausea, vomit x1  GENITOURINARY: No dysuria, frequency, hematuria, or incontinence  NEUROLOGICAL: No headaches, memory loss, loss of strength, numbness, or tremors  SKIN: No itching, burning, rashes, or lesions   MUSCULOSKELETAL: No joint pain or swelling; No muscle, back, or extremity pain  PSYCHIATRIC: No depression, anxiety, mood swings, or difficulty sleeping      Medications:  piperacillin/tazobactam IVPB.. 3.375 Gram(s) IV Intermittent every 8 hours        HYDROmorphone  Injectable 1 milliGRAM(s) IV Push every 2 hours PRN  HYDROmorphone  Injectable 0.5 milliGRAM(s) IV Push every 2 hours PRN  ondansetron Injectable 4 milliGRAM(s) IV Push every 6 hours PRN      enoxaparin Injectable 40 milliGRAM(s) SubCutaneous daily          lactated ringers. 1000 milliLiter(s) IV Continuous <Continuous>    influenza   Vaccine 0.5 milliLiter(s) IntraMuscular once    BACItracin   Ointment 1 Application(s) Topical daily  hydrocortisone 2.5% Ointment 1 Application(s) Topical two times a day            ICU Vital Signs Last 24 Hrs  T(C): 37.3 (05 Dec 2020 00:00), Max: 37.3 (04 Dec 2020 21:25)  T(F): 99.1 (05 Dec 2020 00:00), Max: 99.1 (04 Dec 2020 21:25)  HR: 66 (05 Dec 2020 00:00) (64 - 74)  BP: 142/80 (05 Dec 2020 00:00) (136/82 - 145/84)  RR: 17 (05 Dec 2020 00:00) (16 - 18)  SpO2: 93% (05 Dec 2020 00:00) (93% - 97%)          I&O's Detail    03 Dec 2020 07:01  -  04 Dec 2020 07:00  --------------------------------------------------------  IN:    IV PiggyBack: 100 mL    Lactated Ringers: 100 mL  Total IN: 200 mL    OUT:    Estimated Blood Loss (mL): 4 mL    Voided (mL): 300 mL  Total OUT: 304 mL    Total NET: -104 mL      04 Dec 2020 07:01  -  05 Dec 2020 05:39  --------------------------------------------------------  IN:    IV PiggyBack: 200 mL    Oral Fluid: 320 mL  Total IN: 520 mL    OUT:    Voided (mL): 2300 mL  Total OUT: 2300 mL    Total NET: -1780 mL            LABS:                        11.3   10.30 )-----------( 359      ( 04 Dec 2020 06:00 )             32.2     12-04    136  |  101  |  10  ----------------------------<  119<H>  3.5   |  26  |  0.76    Ca    8.2<L>      04 Dec 2020 06:00  Phos  3.6     12-04  Mg     2.0     12-04            CAPILLARY BLOOD GLUCOSE      POCT Blood Glucose.: 127 mg/dL (04 Dec 2020 02:22)        CULTURES:  Culture Results:   No growth at 5 days (11-29-20 @ 18:45)      Physical Examination:    General: No acute distress.  Alert, oriented, interactive, nonfocal    HEENT: Pupils equal, reactive to light.  Symmetric.    PULM: Clear to auscultation bilaterally, no significant sputum production    CVS: Regular rate and rhythm, no murmurs, rubs, or gallops    ABD: Soft,slight distention noted, non-tender to palp diffusely, staple sites appear NITA, uncanged from earlier shift exams    EXT: No edema, nontender    SKIN: Warm and well perfused, no rashes noted.

## 2020-12-05 NOTE — PROGRESS NOTE ADULT - SUBJECTIVE AND OBJECTIVE BOX
POD #6    Remains afebrile  WBC- 12.32 (slightly increased)  Feeling "much better" this morning  Passed a large amount of flatus this am  Vomited small amount of bile colored fluid last night - no nausea this morning and tolerating PO fluids, again.  Wound - erythematous - probed deeply in all directions without evidence of infection  AXR yesterday - dilated SB but lots of air in colon -     IMPRESSION: Probable ileus - (not SBO as suggested by radiology report from yesterday).    PLAN: Continue PO fluids, analgesics, MODE           Hopefully, can advance diet tomorrow, as long as the patient continues to pass flatus

## 2020-12-05 NOTE — PROGRESS NOTE ADULT - SUBJECTIVE AND OBJECTIVE BOX
Patient is a 50y old  Male who presents with a chief complaint of abd pain, SBO (05 Dec 2020 05:38)      Patient seen and examined at bedside. No acute overnight events. Denies fever, chills, chest pain, shortness of breath.     ALLERGIES:  No Known Allergies    MEDICATIONS  (STANDING):  BACItracin   Ointment 1 Application(s) Topical daily  enoxaparin Injectable 40 milliGRAM(s) SubCutaneous daily  hydrocortisone 2.5% Ointment 1 Application(s) Topical two times a day  influenza   Vaccine 0.5 milliLiter(s) IntraMuscular once  lactated ringers. 1000 milliLiter(s) (100 mL/Hr) IV Continuous <Continuous>  piperacillin/tazobactam IVPB.. 3.375 Gram(s) IV Intermittent every 8 hours    MEDICATIONS  (PRN):  HYDROmorphone  Injectable 1 milliGRAM(s) IV Push every 2 hours PRN Severe Pain (7 - 10)  HYDROmorphone  Injectable 0.5 milliGRAM(s) IV Push every 2 hours PRN Moderate Pain (4 - 6)  ondansetron Injectable 4 milliGRAM(s) IV Push every 6 hours PRN Nausea and/or Vomiting    Vital Signs Last 24 Hrs  T(F): 98.2 (05 Dec 2020 06:11), Max: 99.1 (04 Dec 2020 21:25)  HR: 66 (05 Dec 2020 08:55) (66 - 74)  BP: 136/82 (05 Dec 2020 08:55) (136/82 - 145/84)  RR: 17 (05 Dec 2020 06:11) (16 - 17)  SpO2: 100% (05 Dec 2020 08:55) (93% - 100%)  I&O's Summary    04 Dec 2020 07:01  -  05 Dec 2020 07:00  --------------------------------------------------------  IN: 880 mL / OUT: 2800 mL / NET: -1920 mL    05 Dec 2020 07:01  -  05 Dec 2020 12:02  --------------------------------------------------------  IN: 100 mL / OUT: 700 mL / NET: -600 mL    PHYSICAL EXAM:  General: NAD, A/O x 3  ENT: MMM, no tonsilar exudate  Neck: Supple, No JVD  Lungs: Clear to auscultation bilaterally, no wheezes. Good air entry bilaterally   Cardio: RRR, S1/S2, No murmurs  Abdomen: Soft, Nontender, Nondistended; Bowel sounds present  Extremities: No calf tenderness, No pitting edema    LABS:                        11.8   12.32 )-----------( 434      ( 05 Dec 2020 05:30 )             34.5       12-05    137  |  100  |  7   ----------------------------<  118  3.6   |  27  |  0.82    Ca    8.7      05 Dec 2020 05:30  Phos  3.3     12-05  Mg     2.1     12-05    TPro  6.6  /  Alb  2.8  /  TBili  0.6  /  DBili  x   /  AST  26  /  ALT  43  /  AlkPhos  73  12-05     eGFR if Non African American: 105 mL/min/1.73M2 (12-05-20 @ 05:30)  eGFR if African American: 122 mL/min/1.73M2 (12-05-20 @ 05:30)     Lactate, Blood: 1.0 mmol/L (12-04 @ 20:00)    Culture - Body Fluid with Gram Stain (collected 29 Nov 2020 18:45)  Source: .Body Fluid PERITONEAL FLUID  Gram Stain (30 Nov 2020 04:58):    Rare polymorphonuclear leukocytes seen per low power field    No organisms seen per oil power field  Final Report (04 Dec 2020 18:13):    No growth at 5 days    RADIOLOGY & ADDITIONAL TESTS: EKG, CXR    Care Discussed with Consultants/Other Providers: Yes   Patient is a 50y old  Male who presents with a chief complaint of abd pain, SBO (05 Dec 2020 05:38)    Patient seen and examined at bedside. Complaining of abdominal pain. Episode of green emesis overnight.     ALLERGIES:  No Known Allergies    MEDICATIONS  (STANDING):  BACItracin   Ointment 1 Application(s) Topical daily  enoxaparin Injectable 40 milliGRAM(s) SubCutaneous daily  hydrocortisone 2.5% Ointment 1 Application(s) Topical two times a day  influenza   Vaccine 0.5 milliLiter(s) IntraMuscular once  lactated ringers. 1000 milliLiter(s) (100 mL/Hr) IV Continuous <Continuous>  piperacillin/tazobactam IVPB.. 3.375 Gram(s) IV Intermittent every 8 hours    MEDICATIONS  (PRN):  HYDROmorphone  Injectable 1 milliGRAM(s) IV Push every 2 hours PRN Severe Pain (7 - 10)  HYDROmorphone  Injectable 0.5 milliGRAM(s) IV Push every 2 hours PRN Moderate Pain (4 - 6)  ondansetron Injectable 4 milliGRAM(s) IV Push every 6 hours PRN Nausea and/or Vomiting    Vital Signs Last 24 Hrs  T(F): 98.2 (05 Dec 2020 06:11), Max: 99.1 (04 Dec 2020 21:25)  HR: 66 (05 Dec 2020 08:55) (66 - 74)  BP: 136/82 (05 Dec 2020 08:55) (136/82 - 145/84)  RR: 17 (05 Dec 2020 06:11) (16 - 17)  SpO2: 100% (05 Dec 2020 08:55) (93% - 100%)  I&O's Summary    04 Dec 2020 07:01  -  05 Dec 2020 07:00  --------------------------------------------------------  IN: 880 mL / OUT: 2800 mL / NET: -1920 mL    05 Dec 2020 07:01  -  05 Dec 2020 12:02  --------------------------------------------------------  IN: 100 mL / OUT: 700 mL / NET: -600 mL    PHYSICAL EXAM:  General: Appears uncomfortable. slightly diaphoretic   ENT: MMM, no tonsilar exudate  Neck: Supple, No JVD  Lungs: Clear to auscultation bilaterally, no wheezes. Good air entry bilaterally   Cardio: RRR, S1/S2, No murmurs  Abdomen: Firm, diffuse tenderness. Dressing over midline incision. +BS  Extremities: No calf tenderness, No pitting edema    LABS:                        11.8   12.32 )-----------( 434      ( 05 Dec 2020 05:30 )             34.5       12-05    137  |  100  |  7   ----------------------------<  118  3.6   |  27  |  0.82    Ca    8.7      05 Dec 2020 05:30  Phos  3.3     12-05  Mg     2.1     12-05    TPro  6.6  /  Alb  2.8  /  TBili  0.6  /  DBili  x   /  AST  26  /  ALT  43  /  AlkPhos  73  12-05     eGFR if Non African American: 105 mL/min/1.73M2 (12-05-20 @ 05:30)  eGFR if African American: 122 mL/min/1.73M2 (12-05-20 @ 05:30)     Lactate, Blood: 1.0 mmol/L (12-04 @ 20:00)    Culture - Body Fluid with Gram Stain (collected 29 Nov 2020 18:45)  Source: .Body Fluid PERITONEAL FLUID  Gram Stain (30 Nov 2020 04:58):    Rare polymorphonuclear leukocytes seen per low power field    No organisms seen per oil power field  Final Report (04 Dec 2020 18:13):    No growth at 5 days    RADIOLOGY & ADDITIONAL TESTS: EKG, CXR    Care Discussed with Consultants/Other Providers: Yes

## 2020-12-05 NOTE — PROGRESS NOTE ADULT - ASSESSMENT
50M POD# 6 from EX-lap, ASHLEY, SBR for SBO    Events last 24 hours:   -patient with one episode of vomitting overnight, small amount, green in color. Patient stated he felt much better after vomitting.  -please see chart note from 12/04/2020 for further info regarding abd xray and discussion with surgeon  -abd exam remains unchanged at this time    PLAN:  -will need continued and serial abd exams  -maintain liquid diet, per surgeon, although if vomitting contineus would need assessment for replacement of NGT and NPO  -would benefit from ambulation   -is receiving narcotic pain meds around andrzej clock at this time, would try to avoid if possible given ileus, however patient has requried them for level of pain, despite unchanged abd exam, may also be contributing to nausea  -PRN zofran  -follow lytes  -IVFs until taking adequate PO  -DVt prophylaxis with lovenox  -will need to complete course of Zosyn  -abd xrays to assess for free air as desired by primary surgical team  -at this time he does continue to pass flatus ands tool, but if this changes may need repeat contrast study.

## 2020-12-05 NOTE — PROVIDER CONTACT NOTE (CHANGE IN STATUS NOTIFICATION) - ACTION/TREATMENT ORDERED:
PA assessed abdomen at bedside - ordered to give zofran for nausea, contact PA immediately for any recurrent episodes, PA will then assess for NGT placement

## 2020-12-05 NOTE — PROGRESS NOTE ADULT - ASSESSMENT
50M hx of abdominal surgery at 100 days ago (unclear what type), hx of SBO s/p ASHLEY 1991, hx of SBO in 4/2018 presented with abd pain, noted to have severe sepsis secondary to SBO with ischemic bowel s/p ex lap and small bowel resection    #Sepsis 2/2 ischemic bowel  #SBO  -S/p small bowel resection POD 6  -Follow up with surgery recommendations  -Clear liquid per surgery  -Maintain NGT for now  -IVH  -Pain control  -Continue Abx for now  -Monitor vitals/wbc curve  -leukocytosis worsening  -C/w Zosyn    #DVT/GI proph  Lovenox  Protonix

## 2020-12-06 DIAGNOSIS — K56.609 UNSPECIFIED INTESTINAL OBSTRUCTION, UNSPECIFIED AS TO PARTIAL VERSUS COMPLETE OBSTRUCTION: ICD-10-CM

## 2020-12-06 LAB
ALBUMIN SERPL ELPH-MCNC: 2.7 G/DL — LOW (ref 3.3–5)
ALP SERPL-CCNC: 75 U/L — SIGNIFICANT CHANGE UP (ref 40–120)
ALT FLD-CCNC: 48 U/L — HIGH (ref 10–45)
ANION GAP SERPL CALC-SCNC: 11 MMOL/L — SIGNIFICANT CHANGE UP (ref 5–17)
AST SERPL-CCNC: 27 U/L — SIGNIFICANT CHANGE UP (ref 10–40)
BILIRUB DIRECT SERPL-MCNC: 0.1 MG/DL — SIGNIFICANT CHANGE UP (ref 0–0.2)
BILIRUB INDIRECT FLD-MCNC: 0.5 MG/DL — SIGNIFICANT CHANGE UP (ref 0.2–1)
BILIRUB SERPL-MCNC: 0.6 MG/DL — SIGNIFICANT CHANGE UP (ref 0.2–1.2)
BUN SERPL-MCNC: 6 MG/DL — LOW (ref 7–23)
CALCIUM SERPL-MCNC: 8.4 MG/DL — SIGNIFICANT CHANGE UP (ref 8.4–10.5)
CHLORIDE SERPL-SCNC: 101 MMOL/L — SIGNIFICANT CHANGE UP (ref 96–108)
CO2 SERPL-SCNC: 24 MMOL/L — SIGNIFICANT CHANGE UP (ref 22–31)
CREAT SERPL-MCNC: 0.87 MG/DL — SIGNIFICANT CHANGE UP (ref 0.5–1.3)
GLUCOSE SERPL-MCNC: 118 MG/DL — HIGH (ref 70–99)
HCT VFR BLD CALC: 32.1 % — LOW (ref 39–50)
HGB BLD-MCNC: 11 G/DL — LOW (ref 13–17)
MCHC RBC-ENTMCNC: 31.5 PG — SIGNIFICANT CHANGE UP (ref 27–34)
MCHC RBC-ENTMCNC: 34.3 GM/DL — SIGNIFICANT CHANGE UP (ref 32–36)
MCV RBC AUTO: 92 FL — SIGNIFICANT CHANGE UP (ref 80–100)
NRBC # BLD: 0 /100 WBCS — SIGNIFICANT CHANGE UP (ref 0–0)
PLATELET # BLD AUTO: 439 K/UL — HIGH (ref 150–400)
POTASSIUM SERPL-MCNC: 3.5 MMOL/L — SIGNIFICANT CHANGE UP (ref 3.5–5.3)
POTASSIUM SERPL-SCNC: 3.5 MMOL/L — SIGNIFICANT CHANGE UP (ref 3.5–5.3)
PROT SERPL-MCNC: 6.2 G/DL — SIGNIFICANT CHANGE UP (ref 6–8.3)
RBC # BLD: 3.49 M/UL — LOW (ref 4.2–5.8)
RBC # FLD: 12.1 % — SIGNIFICANT CHANGE UP (ref 10.3–14.5)
SODIUM SERPL-SCNC: 136 MMOL/L — SIGNIFICANT CHANGE UP (ref 135–145)
WBC # BLD: 11.7 K/UL — HIGH (ref 3.8–10.5)
WBC # FLD AUTO: 11.7 K/UL — HIGH (ref 3.8–10.5)

## 2020-12-06 PROCEDURE — 99233 SBSQ HOSP IP/OBS HIGH 50: CPT

## 2020-12-06 RX ORDER — ALPRAZOLAM 0.25 MG
0.25 TABLET ORAL ONCE
Refills: 0 | Status: DISCONTINUED | OUTPATIENT
Start: 2020-12-06 | End: 2020-12-07

## 2020-12-06 RX ADMIN — Medication 1 APPLICATION(S): at 17:40

## 2020-12-06 RX ADMIN — HYDROMORPHONE HYDROCHLORIDE 1 MILLIGRAM(S): 2 INJECTION INTRAMUSCULAR; INTRAVENOUS; SUBCUTANEOUS at 22:46

## 2020-12-06 RX ADMIN — HYDROMORPHONE HYDROCHLORIDE 0.5 MILLIGRAM(S): 2 INJECTION INTRAMUSCULAR; INTRAVENOUS; SUBCUTANEOUS at 18:13

## 2020-12-06 RX ADMIN — HYDROMORPHONE HYDROCHLORIDE 0.5 MILLIGRAM(S): 2 INJECTION INTRAMUSCULAR; INTRAVENOUS; SUBCUTANEOUS at 07:18

## 2020-12-06 RX ADMIN — HYDROMORPHONE HYDROCHLORIDE 0.5 MILLIGRAM(S): 2 INJECTION INTRAMUSCULAR; INTRAVENOUS; SUBCUTANEOUS at 04:42

## 2020-12-06 RX ADMIN — PIPERACILLIN AND TAZOBACTAM 25 GRAM(S): 4; .5 INJECTION, POWDER, LYOPHILIZED, FOR SOLUTION INTRAVENOUS at 05:51

## 2020-12-06 RX ADMIN — HYDROMORPHONE HYDROCHLORIDE 0.5 MILLIGRAM(S): 2 INJECTION INTRAMUSCULAR; INTRAVENOUS; SUBCUTANEOUS at 09:49

## 2020-12-06 RX ADMIN — HYDROMORPHONE HYDROCHLORIDE 0.5 MILLIGRAM(S): 2 INJECTION INTRAMUSCULAR; INTRAVENOUS; SUBCUTANEOUS at 04:27

## 2020-12-06 RX ADMIN — HYDROMORPHONE HYDROCHLORIDE 0.5 MILLIGRAM(S): 2 INJECTION INTRAMUSCULAR; INTRAVENOUS; SUBCUTANEOUS at 07:33

## 2020-12-06 RX ADMIN — HYDROMORPHONE HYDROCHLORIDE 0.5 MILLIGRAM(S): 2 INJECTION INTRAMUSCULAR; INTRAVENOUS; SUBCUTANEOUS at 15:22

## 2020-12-06 RX ADMIN — PIPERACILLIN AND TAZOBACTAM 25 GRAM(S): 4; .5 INJECTION, POWDER, LYOPHILIZED, FOR SOLUTION INTRAVENOUS at 15:08

## 2020-12-06 RX ADMIN — ENOXAPARIN SODIUM 40 MILLIGRAM(S): 100 INJECTION SUBCUTANEOUS at 11:24

## 2020-12-06 RX ADMIN — HYDROMORPHONE HYDROCHLORIDE 0.5 MILLIGRAM(S): 2 INJECTION INTRAMUSCULAR; INTRAVENOUS; SUBCUTANEOUS at 10:04

## 2020-12-06 RX ADMIN — Medication 1 APPLICATION(S): at 05:58

## 2020-12-06 RX ADMIN — HYDROMORPHONE HYDROCHLORIDE 1 MILLIGRAM(S): 2 INJECTION INTRAMUSCULAR; INTRAVENOUS; SUBCUTANEOUS at 02:30

## 2020-12-06 RX ADMIN — HYDROMORPHONE HYDROCHLORIDE 1 MILLIGRAM(S): 2 INJECTION INTRAMUSCULAR; INTRAVENOUS; SUBCUTANEOUS at 22:31

## 2020-12-06 RX ADMIN — ONDANSETRON 4 MILLIGRAM(S): 8 TABLET, FILM COATED ORAL at 07:18

## 2020-12-06 RX ADMIN — HYDROMORPHONE HYDROCHLORIDE 0.5 MILLIGRAM(S): 2 INJECTION INTRAMUSCULAR; INTRAVENOUS; SUBCUTANEOUS at 20:12

## 2020-12-06 RX ADMIN — HYDROMORPHONE HYDROCHLORIDE 0.5 MILLIGRAM(S): 2 INJECTION INTRAMUSCULAR; INTRAVENOUS; SUBCUTANEOUS at 18:28

## 2020-12-06 RX ADMIN — HYDROMORPHONE HYDROCHLORIDE 0.5 MILLIGRAM(S): 2 INJECTION INTRAMUSCULAR; INTRAVENOUS; SUBCUTANEOUS at 20:27

## 2020-12-06 RX ADMIN — Medication 1 APPLICATION(S): at 11:24

## 2020-12-06 RX ADMIN — HYDROMORPHONE HYDROCHLORIDE 0.5 MILLIGRAM(S): 2 INJECTION INTRAMUSCULAR; INTRAVENOUS; SUBCUTANEOUS at 15:07

## 2020-12-06 RX ADMIN — PIPERACILLIN AND TAZOBACTAM 25 GRAM(S): 4; .5 INJECTION, POWDER, LYOPHILIZED, FOR SOLUTION INTRAVENOUS at 21:21

## 2020-12-06 RX ADMIN — HYDROMORPHONE HYDROCHLORIDE 1 MILLIGRAM(S): 2 INJECTION INTRAMUSCULAR; INTRAVENOUS; SUBCUTANEOUS at 02:13

## 2020-12-06 NOTE — PROGRESS NOTE ADULT - SUBJECTIVE AND OBJECTIVE BOX
Patient is a 50y old  Male who presents with a chief complaint of abd pain, SBO (06 Dec 2020 09:15)    Patient seen and examined at bedside. Reports episode of vomiting overnight. Slept better last night. Pain better controlled.     ALLERGIES:  No Known Allergies    MEDICATIONS  (STANDING):  BACItracin   Ointment 1 Application(s) Topical daily  enoxaparin Injectable 40 milliGRAM(s) SubCutaneous daily  hydrocortisone 2.5% Ointment 1 Application(s) Topical two times a day  influenza   Vaccine 0.5 milliLiter(s) IntraMuscular once  lactated ringers. 1000 milliLiter(s) (100 mL/Hr) IV Continuous <Continuous>  piperacillin/tazobactam IVPB.. 3.375 Gram(s) IV Intermittent every 8 hours    MEDICATIONS  (PRN):  HYDROmorphone  Injectable 1 milliGRAM(s) IV Push every 2 hours PRN Severe Pain (7 - 10)  HYDROmorphone  Injectable 0.5 milliGRAM(s) IV Push every 2 hours PRN Moderate Pain (4 - 6)  ondansetron Injectable 4 milliGRAM(s) IV Push every 6 hours PRN Nausea and/or Vomiting    Vital Signs Last 24 Hrs  T(F): 98.4 (06 Dec 2020 04:28), Max: 98.6 (05 Dec 2020 15:58)  HR: 65 (06 Dec 2020 04:28) (65 - 72)  BP: 138/88 (06 Dec 2020 07:15) (126/81 - 138/88)  RR: 12 (06 Dec 2020 04:28) (12 - 16)  SpO2: 95% (06 Dec 2020 04:28) (94% - 95%)  I&O's Summary    05 Dec 2020 07:01  -  06 Dec 2020 07:00  --------------------------------------------------------  IN: 1200 mL / OUT: 2650 mL / NET: -1450 mL    PHYSICAL EXAM:  General: NAD, A/O x 3  ENT: MMM, no tonsilar exudate  Neck: Supple, No JVD  Lungs: Clear to auscultation bilaterally, no wheezes. Good air entry bilaterally   Cardio: RRR, S1/S2, No murmurs  Abdomen: Firm, tenderness on lower quadrants. Dressing over midline incision. bowel sounds present  Extremities: No calf tenderness, No pitting edema    LABS:                        11.0   11.70 )-----------( 439      ( 06 Dec 2020 06:10 )             32.1       12-06    136  |  101  |  6   ----------------------------<  118  3.5   |  24  |  0.87    Ca    8.4      06 Dec 2020 06:10  Phos  3.3     12-05  Mg     2.1     12-05    TPro  6.2  /  Alb  2.7  /  TBili  0.6  /  DBili  0.1  /  AST  27  /  ALT  48  /  AlkPhos  75  12-06     eGFR if Non African American: 102 mL/min/1.73M2 (12-06-20 @ 06:10)  eGFR if African American: 118 mL/min/1.73M2 (12-06-20 @ 06:10)     Lactate, Blood: 1.0 mmol/L (12-04 @ 20:00)    Culture - Body Fluid with Gram Stain (collected 29 Nov 2020 18:45)  Source: .Body Fluid PERITONEAL FLUID  Gram Stain (30 Nov 2020 04:58):    Rare polymorphonuclear leukocytes seen per low power field    No organisms seen per oil power field  Final Report (04 Dec 2020 18:13):    No growth at 5 days    RADIOLOGY & ADDITIONAL TESTS: EKG, CXR    Care Discussed with Consultants/Other Providers: Yes

## 2020-12-06 NOTE — PROGRESS NOTE ADULT - ASSESSMENT
50M hx of abdominal surgery recently (unclear what type), hx of SBO s/p ASHLEY 1991, hx of SBO in 4/2018 presented with abd pain, noted to have severe sepsis secondary to SBO with ischemic bowel s/p ex lap and small bowel resection on 11/29/2020 POD 7, pt states he took some broth and felt Nausea, Overnight event pt with vomiting, currently he denies Vomiting, cp, sob. +flatus and diarrhea last night.

## 2020-12-06 NOTE — PROGRESS NOTE ADULT - PROBLEM SELECTOR PLAN 1
dressing change by Surgeon  OOB, advised pt to ambulate  continue Clears in setting of pts recent events of N/V  WBC slight downtrend  daily labs  surgeon to see pt

## 2020-12-06 NOTE — PROGRESS NOTE ADULT - SUBJECTIVE AND OBJECTIVE BOX
50M hx of abdominal surgery recently (unclear what type), hx of SBO s/p ASHLEY 1991, hx of SBO in 4/2018 presented with abd pain, noted to have severe sepsis secondary to SBO with ischemic bowel s/p ex lap and small bowel resection on 11/29/2020 POD 7, pt states he took some broth and felt Nausea, Overnight event pt with vomiting, currently he denies Vomiting, cp, sob. +flatus and diarrhea last night.    PAST MEDICAL & SURGICAL HISTORY:  No pertinent past medical history    SBO (small bowel obstruction)  s/p sx 1991    H/O abdominal surgery  at age 89 days    MEDICATIONS  (STANDING):  BACItracin   Ointment 1 Application(s) Topical daily  enoxaparin Injectable 40 milliGRAM(s) SubCutaneous daily  hydrocortisone 2.5% Ointment 1 Application(s) Topical two times a day  influenza   Vaccine 0.5 milliLiter(s) IntraMuscular once  lactated ringers. 1000 milliLiter(s) (100 mL/Hr) IV Continuous <Continuous>  piperacillin/tazobactam IVPB.. 3.375 Gram(s) IV Intermittent every 8 hours    MEDICATIONS  (PRN):  HYDROmorphone  Injectable 1 milliGRAM(s) IV Push every 2 hours PRN Severe Pain (7 - 10)  HYDROmorphone  Injectable 0.5 milliGRAM(s) IV Push every 2 hours PRN Moderate Pain (4 - 6)  ondansetron Injectable 4 milliGRAM(s) IV Push every 6 hours PRN Nausea and/or Vomiting    PE:  Vital Signs Last 24 Hrs  T(C): 36.9 (06 Dec 2020 04:28), Max: 37 (05 Dec 2020 15:58)  T(F): 98.4 (06 Dec 2020 04:28), Max: 98.6 (05 Dec 2020 15:58)  HR: 65 (06 Dec 2020 04:28) (65 - 72)  BP: 138/88 (06 Dec 2020 07:15) (126/81 - 138/88)  BP(mean): --  RR: 12 (06 Dec 2020 04:28) (12 - 16)  SpO2: 95% (06 Dec 2020 04:28) (94% - 95%)  Gen: A& O x 3 nad  chest: Cta Bilat  ABD: dressing intact, no drainage noted, abd softly distended, mild tenderness on palpate on incision site.  : voiding                          11.0   11.70 )-----------( 439      ( 06 Dec 2020 06:10 )             32.1   12-06    136  |  101  |  6<L>  ----------------------------<  118<H>  3.5   |  24  |  0.87    Ca    8.4      06 Dec 2020 06:10  Phos  3.3     12-05  Mg     2.1     12-05    TPro  6.2  /  Alb  2.7<L>  /  TBili  0.6  /  DBili  0.1  /  AST  27  /  ALT  48<H>  /  AlkPhos  75  12-06    ABD xray on 12/4: There are air distended small bowel loops with collapse of large bowel consistent with/distal small bowel obstruction. No free intra-abdominal air

## 2020-12-06 NOTE — PROGRESS NOTE ADULT - ASSESSMENT
50M hx of abdominal surgery at 100 days ago (unclear what type), hx of SBO s/p ASHLEY 1991, hx of SBO in 4/2018 presented with abd pain, noted to have severe sepsis secondary to SBO with ischemic bowel s/p ex lap and small bowel resection    #Sepsis 2/2 ischemic bowel  #SBO  -S/p small bowel resection POD 7  -Follow up with surgery recommendations  -Clear liquid per surgery  -IVH  -Pain control  -Continue Abx for now  -Monitor vitals/wbc curve  -leukocytosis worsening  -C/w Zosyn    #DVT/GI proph  Lovenox  Protonix

## 2020-12-07 LAB
ALBUMIN SERPL ELPH-MCNC: 2.8 G/DL — LOW (ref 3.3–5)
ALP SERPL-CCNC: 77 U/L — SIGNIFICANT CHANGE UP (ref 40–120)
ALT FLD-CCNC: 49 U/L — HIGH (ref 10–45)
ANION GAP SERPL CALC-SCNC: 8 MMOL/L — SIGNIFICANT CHANGE UP (ref 5–17)
AST SERPL-CCNC: 22 U/L — SIGNIFICANT CHANGE UP (ref 10–40)
BILIRUB SERPL-MCNC: 0.5 MG/DL — SIGNIFICANT CHANGE UP (ref 0.2–1.2)
BUN SERPL-MCNC: 5 MG/DL — LOW (ref 7–23)
CALCIUM SERPL-MCNC: 8.4 MG/DL — SIGNIFICANT CHANGE UP (ref 8.4–10.5)
CHLORIDE SERPL-SCNC: 101 MMOL/L — SIGNIFICANT CHANGE UP (ref 96–108)
CO2 SERPL-SCNC: 25 MMOL/L — SIGNIFICANT CHANGE UP (ref 22–31)
CREAT SERPL-MCNC: 0.81 MG/DL — SIGNIFICANT CHANGE UP (ref 0.5–1.3)
GLUCOSE SERPL-MCNC: 122 MG/DL — HIGH (ref 70–99)
HCT VFR BLD CALC: 33.1 % — LOW (ref 39–50)
HGB BLD-MCNC: 11.5 G/DL — LOW (ref 13–17)
MAGNESIUM SERPL-MCNC: 2.2 MG/DL — SIGNIFICANT CHANGE UP (ref 1.6–2.6)
MCHC RBC-ENTMCNC: 32 PG — SIGNIFICANT CHANGE UP (ref 27–34)
MCHC RBC-ENTMCNC: 34.7 GM/DL — SIGNIFICANT CHANGE UP (ref 32–36)
MCV RBC AUTO: 92.2 FL — SIGNIFICANT CHANGE UP (ref 80–100)
NRBC # BLD: 0 /100 WBCS — SIGNIFICANT CHANGE UP (ref 0–0)
PLATELET # BLD AUTO: 492 K/UL — HIGH (ref 150–400)
POTASSIUM SERPL-MCNC: 3.6 MMOL/L — SIGNIFICANT CHANGE UP (ref 3.5–5.3)
POTASSIUM SERPL-SCNC: 3.6 MMOL/L — SIGNIFICANT CHANGE UP (ref 3.5–5.3)
PROT SERPL-MCNC: 6.4 G/DL — SIGNIFICANT CHANGE UP (ref 6–8.3)
RBC # BLD: 3.59 M/UL — LOW (ref 4.2–5.8)
RBC # FLD: 12.3 % — SIGNIFICANT CHANGE UP (ref 10.3–14.5)
SODIUM SERPL-SCNC: 134 MMOL/L — LOW (ref 135–145)
WBC # BLD: 10.64 K/UL — HIGH (ref 3.8–10.5)
WBC # FLD AUTO: 10.64 K/UL — HIGH (ref 3.8–10.5)

## 2020-12-07 PROCEDURE — 99233 SBSQ HOSP IP/OBS HIGH 50: CPT

## 2020-12-07 RX ORDER — DIPHENHYDRAMINE HCL 50 MG
25 CAPSULE ORAL EVERY 4 HOURS
Refills: 0 | Status: DISCONTINUED | OUTPATIENT
Start: 2020-12-07 | End: 2020-12-09

## 2020-12-07 RX ADMIN — HYDROMORPHONE HYDROCHLORIDE 1 MILLIGRAM(S): 2 INJECTION INTRAMUSCULAR; INTRAVENOUS; SUBCUTANEOUS at 10:32

## 2020-12-07 RX ADMIN — HYDROMORPHONE HYDROCHLORIDE 1 MILLIGRAM(S): 2 INJECTION INTRAMUSCULAR; INTRAVENOUS; SUBCUTANEOUS at 06:21

## 2020-12-07 RX ADMIN — HYDROMORPHONE HYDROCHLORIDE 1 MILLIGRAM(S): 2 INJECTION INTRAMUSCULAR; INTRAVENOUS; SUBCUTANEOUS at 00:36

## 2020-12-07 RX ADMIN — PIPERACILLIN AND TAZOBACTAM 25 GRAM(S): 4; .5 INJECTION, POWDER, LYOPHILIZED, FOR SOLUTION INTRAVENOUS at 06:06

## 2020-12-07 RX ADMIN — HYDROMORPHONE HYDROCHLORIDE 1 MILLIGRAM(S): 2 INJECTION INTRAMUSCULAR; INTRAVENOUS; SUBCUTANEOUS at 18:38

## 2020-12-07 RX ADMIN — ENOXAPARIN SODIUM 40 MILLIGRAM(S): 100 INJECTION SUBCUTANEOUS at 11:39

## 2020-12-07 RX ADMIN — SODIUM CHLORIDE 100 MILLILITER(S): 9 INJECTION, SOLUTION INTRAVENOUS at 13:26

## 2020-12-07 RX ADMIN — HYDROMORPHONE HYDROCHLORIDE 1 MILLIGRAM(S): 2 INJECTION INTRAMUSCULAR; INTRAVENOUS; SUBCUTANEOUS at 00:57

## 2020-12-07 RX ADMIN — HYDROMORPHONE HYDROCHLORIDE 1 MILLIGRAM(S): 2 INJECTION INTRAMUSCULAR; INTRAVENOUS; SUBCUTANEOUS at 12:53

## 2020-12-07 RX ADMIN — HYDROMORPHONE HYDROCHLORIDE 1 MILLIGRAM(S): 2 INJECTION INTRAMUSCULAR; INTRAVENOUS; SUBCUTANEOUS at 06:06

## 2020-12-07 RX ADMIN — HYDROMORPHONE HYDROCHLORIDE 1 MILLIGRAM(S): 2 INJECTION INTRAMUSCULAR; INTRAVENOUS; SUBCUTANEOUS at 21:28

## 2020-12-07 RX ADMIN — Medication 25 MILLIGRAM(S): at 11:38

## 2020-12-07 RX ADMIN — Medication 1 APPLICATION(S): at 11:39

## 2020-12-07 RX ADMIN — Medication 25 MILLIGRAM(S): at 21:28

## 2020-12-07 RX ADMIN — HYDROMORPHONE HYDROCHLORIDE 1 MILLIGRAM(S): 2 INJECTION INTRAMUSCULAR; INTRAVENOUS; SUBCUTANEOUS at 18:23

## 2020-12-07 RX ADMIN — HYDROMORPHONE HYDROCHLORIDE 1 MILLIGRAM(S): 2 INJECTION INTRAMUSCULAR; INTRAVENOUS; SUBCUTANEOUS at 12:38

## 2020-12-07 RX ADMIN — HYDROMORPHONE HYDROCHLORIDE 1 MILLIGRAM(S): 2 INJECTION INTRAMUSCULAR; INTRAVENOUS; SUBCUTANEOUS at 04:10

## 2020-12-07 RX ADMIN — HYDROMORPHONE HYDROCHLORIDE 1 MILLIGRAM(S): 2 INJECTION INTRAMUSCULAR; INTRAVENOUS; SUBCUTANEOUS at 03:28

## 2020-12-07 RX ADMIN — PIPERACILLIN AND TAZOBACTAM 25 GRAM(S): 4; .5 INJECTION, POWDER, LYOPHILIZED, FOR SOLUTION INTRAVENOUS at 21:30

## 2020-12-07 RX ADMIN — Medication 1 APPLICATION(S): at 06:06

## 2020-12-07 RX ADMIN — PIPERACILLIN AND TAZOBACTAM 25 GRAM(S): 4; .5 INJECTION, POWDER, LYOPHILIZED, FOR SOLUTION INTRAVENOUS at 14:59

## 2020-12-07 RX ADMIN — HYDROMORPHONE HYDROCHLORIDE 1 MILLIGRAM(S): 2 INJECTION INTRAMUSCULAR; INTRAVENOUS; SUBCUTANEOUS at 14:59

## 2020-12-07 RX ADMIN — Medication 0.25 MILLIGRAM(S): at 00:01

## 2020-12-07 RX ADMIN — HYDROMORPHONE HYDROCHLORIDE 1 MILLIGRAM(S): 2 INJECTION INTRAMUSCULAR; INTRAVENOUS; SUBCUTANEOUS at 21:45

## 2020-12-07 RX ADMIN — HYDROMORPHONE HYDROCHLORIDE 1 MILLIGRAM(S): 2 INJECTION INTRAMUSCULAR; INTRAVENOUS; SUBCUTANEOUS at 10:47

## 2020-12-07 RX ADMIN — HYDROMORPHONE HYDROCHLORIDE 1 MILLIGRAM(S): 2 INJECTION INTRAMUSCULAR; INTRAVENOUS; SUBCUTANEOUS at 15:14

## 2020-12-07 RX ADMIN — Medication 1 APPLICATION(S): at 18:26

## 2020-12-07 NOTE — PROGRESS NOTE ADULT - SUBJECTIVE AND OBJECTIVE BOX
Patient is a 50y old  Male who presents with a chief complaint of abd pain, SBO (06 Dec 2020 10:18)    Patient seen and examined at bedside. No acute overnight events. Reports improvement of abdominal pain. Difficulty sleeping last night, +itchy rash.     ALLERGIES:  No Known Allergies    MEDICATIONS  (STANDING):  BACItracin   Ointment 1 Application(s) Topical daily  enoxaparin Injectable 40 milliGRAM(s) SubCutaneous daily  influenza   Vaccine 0.5 milliLiter(s) IntraMuscular once  lactated ringers. 1000 milliLiter(s) (100 mL/Hr) IV Continuous <Continuous>  piperacillin/tazobactam IVPB.. 3.375 Gram(s) IV Intermittent every 8 hours  triamcinolone 0.1% Cream 1 Application(s) Topical two times a day    MEDICATIONS  (PRN):  diphenhydrAMINE 25 milliGRAM(s) Oral every 4 hours PRN Rash and/or Itching  HYDROmorphone  Injectable 1 milliGRAM(s) IV Push every 2 hours PRN Severe Pain (7 - 10)  HYDROmorphone  Injectable 0.5 milliGRAM(s) IV Push every 2 hours PRN Moderate Pain (4 - 6)  ondansetron Injectable 4 milliGRAM(s) IV Push every 6 hours PRN Nausea and/or Vomiting    Vital Signs Last 24 Hrs  T(F): 98 (07 Dec 2020 05:27), Max: 98.3 (07 Dec 2020 00:01)  HR: 61 (07 Dec 2020 05:27) (61 - 73)  BP: 121/77 (07 Dec 2020 05:27) (121/77 - 138/74)  RR: 17 (07 Dec 2020 05:27) (15 - 17)  SpO2: 95% (07 Dec 2020 05:27) (94% - 100%)  I&O's Summary    06 Dec 2020 07:01  -  07 Dec 2020 07:00  --------------------------------------------------------  IN: 1280 mL / OUT: 3200 mL / NET: -1920 mL    PHYSICAL EXAM:  General: NAD, A/O x 3  ENT: MMM, no tonsilar exudate  Neck: Supple, No JVD  Lungs: Clear to auscultation bilaterally, no wheezes. Good air entry bilaterally   Cardio: RRR, S1/S2, No murmurs  Abdomen: Soft, tender to deep palpation. Dressing over midline incision. +BS   Extremities: No calf tenderness, No pitting edema  Skin: rash over LUE, abdomen. Consistent with areas that were exposed to adhesive (IV live, wound dressing). No rash on LE    LABS:                        11.5   10.64 )-----------( 492      ( 07 Dec 2020 05:37 )             33.1       12-07    134  |  101  |  5   ----------------------------<  122  3.6   |  25  |  0.81    Ca    8.4      07 Dec 2020 05:37  Phos  3.3     12-05  Mg     2.2     12-07    TPro  6.4  /  Alb  2.8  /  TBili  0.5  /  DBili  x   /  AST  22  /  ALT  49  /  AlkPhos  77  12-07     eGFR if Non African American: 105 mL/min/1.73M2 (12-07-20 @ 05:37)  eGFR if African American: 122 mL/min/1.73M2 (12-07-20 @ 05:37)     Lactate, Blood: 1.0 mmol/L (12-04 @ 20:00)    RADIOLOGY & ADDITIONAL TESTS: EKG, CXR    Care Discussed with Consultants/Other Providers: Yes

## 2020-12-07 NOTE — CHART NOTE - NSCHARTNOTEFT_GEN_A_CORE
Nutrition Follow Up Note  Hospital Course (Per Electronic Medical Record):   Source: Medical Record [X] Patient [X]  Nursing Staff [X]     Diet: Low Fiber     Patient POD #8 , diet advanced to low fiber , c/o hunger . No further GI upset reported as per patient . Instructed patient on Low Fiber diet , as per MD patient will have to remain on Low fiber as a permanent diet restriction due to GI condition.        Current Weight: (12/7) 189.5/86kg , weight noted stable with slight fluctuation during hospitalization.     Pertinent Medications: MEDICATIONS  (STANDING):  BACItracin   Ointment 1 Application(s) Topical daily  enoxaparin Injectable 40 milliGRAM(s) SubCutaneous daily  influenza   Vaccine 0.5 milliLiter(s) IntraMuscular once  lactated ringers. 1000 milliLiter(s) (100 mL/Hr) IV Continuous <Continuous>  piperacillin/tazobactam IVPB.. 3.375 Gram(s) IV Intermittent every 8 hours  triamcinolone 0.1% Cream 1 Application(s) Topical two times a day    MEDICATIONS  (PRN):  diphenhydrAMINE 25 milliGRAM(s) Oral every 4 hours PRN Rash and/or Itching  HYDROmorphone  Injectable 1 milliGRAM(s) IV Push every 2 hours PRN Severe Pain (7 - 10)  HYDROmorphone  Injectable 0.5 milliGRAM(s) IV Push every 2 hours PRN Moderate Pain (4 - 6)  ondansetron Injectable 4 milliGRAM(s) IV Push every 6 hours PRN Nausea and/or Vomiting      Pertinent Labs:  12-07 Na134 mmol/L<L> Glu 122 mg/dL<H> K+ 3.6 mmol/L Cr  0.81 mg/dL BUN 5 mg/dL<L> 12-05 Phos 3.3 mg/dL 12-07 Alb 2.8 g/dL<L>        Skin: intact     Edema: none    Last BM: on (12/6)     Estimated Needs:   [X] No Change since Previous Assessment      Previous Nutrition Diagnosis: Altered GI function, ongoing        New Nutrition Diagnosis: [X] Not Applicable    Interventions:   1. Recommend continue low fiber       Monitoring & Evaluation: will monitor:  [X] Weights   [X] PO Intake   [X] Follow Up (Per Protocol)  [X] Tolerance to Diet Prescription       RD to follow as per Nutrition protocol  Odessa Edgar RDN

## 2020-12-07 NOTE — PROGRESS NOTE ADULT - ASSESSMENT
50M hx of abdominal surgery at 100 days ago (unclear what type), hx of SBO s/p ASHLEY 1991, hx of SBO in 4/2018 presented with abd pain, noted to have severe sepsis secondary to SBO with ischemic bowel s/p ex lap and small bowel resection    #Sepsis 2/2 ischemic bowel  #SBO  -S/p small bowel resection POD 8  -Follow up with surgery recommendations  -Low fiber diet per surgery  -IVH  -Pain control  -Continue Abx for now  -Monitor vitals/wbc curve  -C/w Zosyn    # Rash  - Benadryl prn  - Triamcinolone cream    #DVT/GI proph  Lovenox  Protonix

## 2020-12-07 NOTE — PROGRESS NOTE ADULT - SUBJECTIVE AND OBJECTIVE BOX
Patient is a 50y old  Male who presents with a chief complaint of abd pain, SBO (07 Dec 2020 22:39)      BRIEF HOSPITAL COURSE:     50M POD# 8 from EX-ASHLEY connelly SBR for SBO    Events last 24 hours:   -patient seems much improved tonight, happier, states he has less pain, and his stomach feels "less tight"  -has been able to tolerate diet much better  - has had (+) flatus and BMs    PAST MEDICAL & SURGICAL HISTORY:  No pertinent past medical history    SBO (small bowel obstruction)  s/p sx 1991    H/O abdominal surgery  at age 100 days        Review of Systems:  CONSTITUTIONAL: No fever, chills, or fatigue  EYES: No eye pain, visual disturbances, or discharge  ENMT:  No difficulty hearing, tinnitus, vertigo; No sinus or throat pain  NECK: No pain or stiffness  RESPIRATORY: No cough, wheezing, chills or hemoptysis; No shortness of breath  CARDIOVASCULAR: No chest pain, palpitations, dizziness, or leg swelling  GASTROINTESTINAL: No abdominal or epigastric pain. No nausea, vomiting, or hematemesis; No diarrhea or constipation. No melena or hematochezia.  GENITOURINARY: No dysuria, frequency, hematuria, or incontinence  NEUROLOGICAL: No headaches, memory loss, loss of strength, numbness, or tremors  SKIN: No itching, burning, rashes, or lesions   MUSCULOSKELETAL: No joint pain or swelling; No muscle, back, or extremity pain  PSYCHIATRIC: No depression, anxiety, mood swings, or difficulty sleeping      Medications:  piperacillin/tazobactam IVPB.. 3.375 Gram(s) IV Intermittent every 8 hours        diphenhydrAMINE 25 milliGRAM(s) Oral every 4 hours PRN  HYDROmorphone  Injectable 1 milliGRAM(s) IV Push every 2 hours PRN  HYDROmorphone  Injectable 0.5 milliGRAM(s) IV Push every 2 hours PRN  ondansetron Injectable 4 milliGRAM(s) IV Push every 6 hours PRN      enoxaparin Injectable 40 milliGRAM(s) SubCutaneous daily          lactated ringers. 1000 milliLiter(s) IV Continuous <Continuous>    influenza   Vaccine 0.5 milliLiter(s) IntraMuscular once    BACItracin   Ointment 1 Application(s) Topical daily  triamcinolone 0.1% Cream 1 Application(s) Topical two times a day            ICU Vital Signs Last 24 Hrs  T(C): 37.1 (07 Dec 2020 16:00), Max: 37.1 (07 Dec 2020 16:00)  T(F): 98.7 (07 Dec 2020 16:00), Max: 98.7 (07 Dec 2020 16:00)  HR: 84 (07 Dec 2020 16:00) (61 - 84)  BP: 117/76 (07 Dec 2020 16:00) (117/76 - 133/76)  RR: 17 (07 Dec 2020 16:00) (17 - 17)  SpO2: 94% (07 Dec 2020 16:00) (94% - 99%)          I&O's Detail    06 Dec 2020 07:01  -  07 Dec 2020 07:00  --------------------------------------------------------  IN:    Oral Fluid: 1280 mL  Total IN: 1280 mL    OUT:    Voided (mL): 3200 mL  Total OUT: 3200 mL    Total NET: -1920 mL      07 Dec 2020 07:01  -  07 Dec 2020 23:09  --------------------------------------------------------  IN:    Oral Fluid: 600 mL  Total IN: 600 mL    OUT:    Voided (mL): 1000 mL  Total OUT: 1000 mL    Total NET: -400 mL            LABS:                        11.5   10.64 )-----------( 492      ( 07 Dec 2020 05:37 )             33.1     12-07    134<L>  |  101  |  5<L>  ----------------------------<  122<H>  3.6   |  25  |  0.81    Ca    8.4      07 Dec 2020 05:37  Mg     2.2     12-07    TPro  6.4  /  Alb  2.8<L>  /  TBili  0.5  /  DBili  x   /  AST  22  /  ALT  49<H>  /  AlkPhos  77  12-07          CAPILLARY BLOOD GLUCOSE            CULTURES:      Physical Examination:    General: No acute distress.  Alert, oriented, interactive, nonfocal    HEENT: Pupils equal, reactive to light.  Symmetric.    PULM: Clear to auscultation bilaterally, no significant sputum production    CVS: Regular rate and rhythm, no murmurs, rubs, or gallops    ABD: Soft, nondistended, nontender, normoactive bowel sounds, no masses. Surgial site, staples, CDI no drainage    EXT: No edema, nontender    SKIN: Warm and well perfused, no rashes noted.

## 2020-12-07 NOTE — PROGRESS NOTE ADULT - ASSESSMENT
50M POD# 8 from EX-lap, ASHLEY, SBR for SBO    Events last 24 hours:   -patient seems much improved tonight, happier, states he has less pain, and his stomach feels "less tight"  -has been able to tolerate diet much better  - has had (+) flatus and BMs    PLAN:  -continue serial abd exams  -pain control, limit narcotics as able, encourage ambulation  -on regular diet now  -continues on zosyn, will need to complete course  -F/U Am labs

## 2020-12-07 NOTE — PROGRESS NOTE ADULT - ASSESSMENT
A: probable post op  ileus    P: advance to low fiber diet today    continue IV antibiotics  -medical team to further evaluate/treat rash.  -ambulate/ IS

## 2020-12-07 NOTE — PROGRESS NOTE ADULT - SUBJECTIVE AND OBJECTIVE BOX
POD #8    Pt sitting up in bed. No new complaints. tolerating clears + Flatus + BM x 2 yesterday.  pain controlled No CP No SOB  Vital Signs Last 24 Hrs  T(C): 36.6 (07 Dec 2020 10:09), Max: 36.8 (07 Dec 2020 00:01)  T(F): 97.9 (07 Dec 2020 10:09), Max: 98.3 (07 Dec 2020 00:01)  HR: 65 (07 Dec 2020 10:09) (61 - 72)  BP: 119/69 (07 Dec 2020 10:09) (119/69 - 138/74)  BP(mean): --  RR: 17 (07 Dec 2020 10:09) (16 - 17)  SpO2: 99% (07 Dec 2020 10:09) (94% - 99%)                          11.5   10.64 )-----------( 492      ( 07 Dec 2020 05:37 )             33.1   12-07    134<L>  |  101  |  5<L>  ----------------------------<  122<H>  3.6   |  25  |  0.81    Ca    8.4      07 Dec 2020 05:37  Mg     2.2     12-07    TPro  6.4  /  Alb  2.8<L>  /  TBili  0.5  /  DBili  x   /  AST  22  /  ALT  49<H>  /  AlkPhos  77  12-07  I&O's Detail    06 Dec 2020 07:01  -  07 Dec 2020 07:00  --------------------------------------------------------  IN:    Oral Fluid: 1280 mL  Total IN: 1280 mL    OUT:    Voided (mL): 3200 mL  Total OUT: 3200 mL    Total NET: -1920 mL      07 Dec 2020 07:01  -  07 Dec 2020 11:52  --------------------------------------------------------  IN:    Oral Fluid: 200 mL  Total IN: 200 mL    OUT:    Voided (mL): 700 mL  Total OUT: 700 mL    Total NET: -500 mL    PE: wound slightly erythematous no active drainage noted + distention

## 2020-12-08 LAB
ANION GAP SERPL CALC-SCNC: 10 MMOL/L — SIGNIFICANT CHANGE UP (ref 5–17)
BUN SERPL-MCNC: 5 MG/DL — LOW (ref 7–23)
CALCIUM SERPL-MCNC: 8.9 MG/DL — SIGNIFICANT CHANGE UP (ref 8.4–10.5)
CHLORIDE SERPL-SCNC: 100 MMOL/L — SIGNIFICANT CHANGE UP (ref 96–108)
CO2 SERPL-SCNC: 25 MMOL/L — SIGNIFICANT CHANGE UP (ref 22–31)
CREAT SERPL-MCNC: 1.03 MG/DL — SIGNIFICANT CHANGE UP (ref 0.5–1.3)
GLUCOSE SERPL-MCNC: 119 MG/DL — HIGH (ref 70–99)
HCT VFR BLD CALC: 35.1 % — LOW (ref 39–50)
HGB BLD-MCNC: 11.8 G/DL — LOW (ref 13–17)
MCHC RBC-ENTMCNC: 30.8 PG — SIGNIFICANT CHANGE UP (ref 27–34)
MCHC RBC-ENTMCNC: 33.6 GM/DL — SIGNIFICANT CHANGE UP (ref 32–36)
MCV RBC AUTO: 91.6 FL — SIGNIFICANT CHANGE UP (ref 80–100)
NRBC # BLD: 0 /100 WBCS — SIGNIFICANT CHANGE UP (ref 0–0)
PLATELET # BLD AUTO: 523 K/UL — HIGH (ref 150–400)
POTASSIUM SERPL-MCNC: 3.7 MMOL/L — SIGNIFICANT CHANGE UP (ref 3.5–5.3)
POTASSIUM SERPL-SCNC: 3.7 MMOL/L — SIGNIFICANT CHANGE UP (ref 3.5–5.3)
RBC # BLD: 3.83 M/UL — LOW (ref 4.2–5.8)
RBC # FLD: 12.3 % — SIGNIFICANT CHANGE UP (ref 10.3–14.5)
SODIUM SERPL-SCNC: 135 MMOL/L — SIGNIFICANT CHANGE UP (ref 135–145)
WBC # BLD: 10.84 K/UL — HIGH (ref 3.8–10.5)
WBC # FLD AUTO: 10.84 K/UL — HIGH (ref 3.8–10.5)

## 2020-12-08 PROCEDURE — 99233 SBSQ HOSP IP/OBS HIGH 50: CPT

## 2020-12-08 RX ORDER — OXYCODONE HYDROCHLORIDE 5 MG/1
5 TABLET ORAL EVERY 4 HOURS
Refills: 0 | Status: DISCONTINUED | OUTPATIENT
Start: 2020-12-08 | End: 2020-12-09

## 2020-12-08 RX ORDER — LIDOCAINE 4 G/100G
1 CREAM TOPICAL EVERY 6 HOURS
Refills: 0 | Status: DISCONTINUED | OUTPATIENT
Start: 2020-12-08 | End: 2020-12-09

## 2020-12-08 RX ORDER — ACETAMINOPHEN 500 MG
650 TABLET ORAL EVERY 6 HOURS
Refills: 0 | Status: DISCONTINUED | OUTPATIENT
Start: 2020-12-08 | End: 2020-12-09

## 2020-12-08 RX ORDER — HYDROXYZINE HCL 10 MG
25 TABLET ORAL THREE TIMES A DAY
Refills: 0 | Status: DISCONTINUED | OUTPATIENT
Start: 2020-12-08 | End: 2020-12-09

## 2020-12-08 RX ORDER — SODIUM CHLORIDE 9 MG/ML
1000 INJECTION, SOLUTION INTRAVENOUS
Refills: 0 | Status: DISCONTINUED | OUTPATIENT
Start: 2020-12-08 | End: 2020-12-09

## 2020-12-08 RX ORDER — OXYCODONE HYDROCHLORIDE 5 MG/1
10 TABLET ORAL EVERY 4 HOURS
Refills: 0 | Status: DISCONTINUED | OUTPATIENT
Start: 2020-12-08 | End: 2020-12-09

## 2020-12-08 RX ADMIN — ENOXAPARIN SODIUM 40 MILLIGRAM(S): 100 INJECTION SUBCUTANEOUS at 10:52

## 2020-12-08 RX ADMIN — HYDROMORPHONE HYDROCHLORIDE 1 MILLIGRAM(S): 2 INJECTION INTRAMUSCULAR; INTRAVENOUS; SUBCUTANEOUS at 08:20

## 2020-12-08 RX ADMIN — HYDROMORPHONE HYDROCHLORIDE 1 MILLIGRAM(S): 2 INJECTION INTRAMUSCULAR; INTRAVENOUS; SUBCUTANEOUS at 07:48

## 2020-12-08 RX ADMIN — OXYCODONE HYDROCHLORIDE 10 MILLIGRAM(S): 5 TABLET ORAL at 21:11

## 2020-12-08 RX ADMIN — Medication 25 MILLIGRAM(S): at 21:12

## 2020-12-08 RX ADMIN — Medication 25 MILLIGRAM(S): at 14:37

## 2020-12-08 RX ADMIN — OXYCODONE HYDROCHLORIDE 10 MILLIGRAM(S): 5 TABLET ORAL at 15:53

## 2020-12-08 RX ADMIN — Medication 1 APPLICATION(S): at 17:13

## 2020-12-08 RX ADMIN — SODIUM CHLORIDE 50 MILLILITER(S): 9 INJECTION, SOLUTION INTRAVENOUS at 21:17

## 2020-12-08 RX ADMIN — OXYCODONE HYDROCHLORIDE 10 MILLIGRAM(S): 5 TABLET ORAL at 11:25

## 2020-12-08 RX ADMIN — HYDROMORPHONE HYDROCHLORIDE 1 MILLIGRAM(S): 2 INJECTION INTRAMUSCULAR; INTRAVENOUS; SUBCUTANEOUS at 04:00

## 2020-12-08 RX ADMIN — OXYCODONE HYDROCHLORIDE 10 MILLIGRAM(S): 5 TABLET ORAL at 22:00

## 2020-12-08 RX ADMIN — Medication 1 APPLICATION(S): at 05:10

## 2020-12-08 RX ADMIN — HYDROMORPHONE HYDROCHLORIDE 1 MILLIGRAM(S): 2 INJECTION INTRAMUSCULAR; INTRAVENOUS; SUBCUTANEOUS at 00:59

## 2020-12-08 RX ADMIN — PIPERACILLIN AND TAZOBACTAM 25 GRAM(S): 4; .5 INJECTION, POWDER, LYOPHILIZED, FOR SOLUTION INTRAVENOUS at 05:08

## 2020-12-08 RX ADMIN — LIDOCAINE 1 APPLICATION(S): 4 CREAM TOPICAL at 21:13

## 2020-12-08 RX ADMIN — PIPERACILLIN AND TAZOBACTAM 25 GRAM(S): 4; .5 INJECTION, POWDER, LYOPHILIZED, FOR SOLUTION INTRAVENOUS at 14:21

## 2020-12-08 RX ADMIN — Medication 1 APPLICATION(S): at 10:54

## 2020-12-08 RX ADMIN — HYDROMORPHONE HYDROCHLORIDE 1 MILLIGRAM(S): 2 INJECTION INTRAMUSCULAR; INTRAVENOUS; SUBCUTANEOUS at 03:30

## 2020-12-08 RX ADMIN — PIPERACILLIN AND TAZOBACTAM 25 GRAM(S): 4; .5 INJECTION, POWDER, LYOPHILIZED, FOR SOLUTION INTRAVENOUS at 21:13

## 2020-12-08 RX ADMIN — Medication 25 MILLIGRAM(S): at 09:49

## 2020-12-08 RX ADMIN — OXYCODONE HYDROCHLORIDE 10 MILLIGRAM(S): 5 TABLET ORAL at 10:55

## 2020-12-08 RX ADMIN — OXYCODONE HYDROCHLORIDE 10 MILLIGRAM(S): 5 TABLET ORAL at 16:30

## 2020-12-08 RX ADMIN — HYDROMORPHONE HYDROCHLORIDE 1 MILLIGRAM(S): 2 INJECTION INTRAMUSCULAR; INTRAVENOUS; SUBCUTANEOUS at 01:15

## 2020-12-08 NOTE — PROGRESS NOTE ADULT - ASSESSMENT
50M hx of abdominal surgery at 100 days ago (unclear what type), hx of SBO s/p ASHLEY 1991, hx of SBO in 4/2018 presented with abd pain, noted to have severe sepsis secondary to SBO with ischemic bowel s/p ex lap and small bowel resection    #Sepsis 2/2 ischemic bowel  #SBO  -S/p small bowel resection POD 9  -Follow up with surgery recommendations  -Low fiber diet per surgery  -IVH  -Pain control, will convert to PO  -Continue Abx for now  -Monitor vitals/wbc curve  -C/w Zosyn    # Rash - improving  - Benadryl prn. will add atarax and lidocaine cream  - Triamcinolone cream    #DVT/GI proph  Lovenox  Protonix

## 2020-12-08 NOTE — PROGRESS NOTE ADULT - ASSESSMENT
Pt POD # 9 s/p ex-lap, ASHLEY and small bowel resection for SBO  no events noted over night  + flatus, no BM yesterday, tolerating low fiber diet, denies n/v  pt has reactive dermatitis on b/l axilla, abdomen and back    pt doing well from surgical standpoint    labs wnl    plan  - switch IV pain meds to PO  - leave incision open to air, no dressings required anymore  - one more day of IV abx for a total of 10 days post op  - benadryl prn itching  - apply topical corticosteroid creams in affected areas daily  - low fiber diet  - DVT prophylaxis  - ambulate  - anticipate d/c in am

## 2020-12-08 NOTE — PROGRESS NOTE ADULT - SUBJECTIVE AND OBJECTIVE BOX
Pt POD # 9 s/p ex-lap, ASHLEY and small bowel resection for SBO  no events noted over night  + flatus, no BM yesterday, tolerating low fiber diet, denies n/v  pt has reactive dermatitis on b/l axilla, abdomen and back    Patient seen and examined at bedside    ALLERGIES:  No Known Allergies    MEDICATIONS  (STANDING):  BACItracin   Ointment 1 Application(s) Topical daily  enoxaparin Injectable 40 milliGRAM(s) SubCutaneous daily  hydrOXYzine hydrochloride 25 milliGRAM(s) Oral three times a day  influenza   Vaccine 0.5 milliLiter(s) IntraMuscular once  lactated ringers. 1000 milliLiter(s) (50 mL/Hr) IV Continuous <Continuous>  piperacillin/tazobactam IVPB.. 3.375 Gram(s) IV Intermittent every 8 hours  triamcinolone 0.1% Cream 1 Application(s) Topical two times a day    MEDICATIONS  (PRN):  acetaminophen   Tablet .. 650 milliGRAM(s) Oral every 6 hours PRN Temp greater or equal to 38C (100.4F), Mild Pain (1 - 3)  diphenhydrAMINE 25 milliGRAM(s) Oral every 4 hours PRN Rash and/or Itching  lidocaine 5% Ointment 1 Application(s) Topical every 6 hours PRN rash/itching  ondansetron Injectable 4 milliGRAM(s) IV Push every 6 hours PRN Nausea and/or Vomiting  oxyCODONE    IR 10 milliGRAM(s) Oral every 4 hours PRN Severe Pain (7 - 10)  oxyCODONE    IR 5 milliGRAM(s) Oral every 4 hours PRN Moderate Pain (4 - 6)    Vital Signs Last 24 Hrs  T(F): 97.9 (08 Dec 2020 10:38), Max: 98.7 (07 Dec 2020 16:00)  HR: 66 (08 Dec 2020 10:38) (66 - 84)  BP: 111/80 (08 Dec 2020 10:38) (111/80 - 139/88)  RR: 15 (08 Dec 2020 10:38) (14 - 17)  SpO2: 93% (08 Dec 2020 10:38) (93% - 97%)  I&O's Summary    07 Dec 2020 07:01  -  08 Dec 2020 07:00  --------------------------------------------------------  IN: 1700 mL / OUT: 1000 mL / NET: 700 mL    08 Dec 2020 07:01  -  08 Dec 2020 14:18  --------------------------------------------------------  IN: 200 mL / OUT: 0 mL / NET: 200 mL    PHYSICAL EXAM:  General: NAD, A/O x 3, resting comfortably in bed, non toxic appearance  ENT: MMM  Neck: Supple, No JVD  Abdomen: soft, mildly tender to palpation non distended, incision healing well, staples in place, pt has dermatitis/pruritic urticaria along periphery of incision, no guarding  Extremities: No calf tenderness, No pitting edema    LABS:                        11.8   10.84 )-----------( 523      ( 08 Dec 2020 07:00 )             35.1     12-08    135  |  100  |  5   ----------------------------<  119  3.7   |  25  |  1.03    Ca    8.9      08 Dec 2020 07:00  Mg     2.2     12-07    TPro  6.4  /  Alb  2.8  /  TBili  0.5  /  DBili  x   /  AST  22  /  ALT  49  /  AlkPhos  77  12-07    eGFR if Non African American: 86 mL/min/1.73M2 (12-08-20 @ 07:00)  eGFR if : 99 mL/min/1.73M2 (12-08-20 @ 07:00)

## 2020-12-08 NOTE — PROGRESS NOTE ADULT - SUBJECTIVE AND OBJECTIVE BOX
Patient is a 50y old  Male who presents with a chief complaint of abd pain, SBO (07 Dec 2020 22:39)    Patient seen and examined at bedside. No acute overnight events. Reports feeling better, tolerated diet well yesterday. Itching persists, rash improved. Still requiring IV pain meds.    ALLERGIES:  No Known Allergies    MEDICATIONS  (STANDING):  BACItracin   Ointment 1 Application(s) Topical daily  enoxaparin Injectable 40 milliGRAM(s) SubCutaneous daily  influenza   Vaccine 0.5 milliLiter(s) IntraMuscular once  lactated ringers. 1000 milliLiter(s) (50 mL/Hr) IV Continuous <Continuous>  piperacillin/tazobactam IVPB.. 3.375 Gram(s) IV Intermittent every 8 hours  triamcinolone 0.1% Cream 1 Application(s) Topical two times a day    MEDICATIONS  (PRN):  acetaminophen   Tablet .. 650 milliGRAM(s) Oral every 6 hours PRN Temp greater or equal to 38C (100.4F), Mild Pain (1 - 3)  diphenhydrAMINE 25 milliGRAM(s) Oral every 4 hours PRN Rash and/or Itching  ondansetron Injectable 4 milliGRAM(s) IV Push every 6 hours PRN Nausea and/or Vomiting  oxyCODONE    IR 10 milliGRAM(s) Oral every 4 hours PRN Severe Pain (7 - 10)  oxyCODONE    IR 5 milliGRAM(s) Oral every 4 hours PRN Moderate Pain (4 - 6)    Vital Signs Last 24 Hrs  T(F): 97.9 (08 Dec 2020 10:38), Max: 98.7 (07 Dec 2020 16:00)  HR: 66 (08 Dec 2020 10:38) (66 - 84)  BP: 111/80 (08 Dec 2020 10:38) (111/80 - 139/88)  RR: 15 (08 Dec 2020 10:38) (14 - 17)  SpO2: 93% (08 Dec 2020 10:38) (93% - 97%)  I&O's Summary    07 Dec 2020 07:01  -  08 Dec 2020 07:00  --------------------------------------------------------  IN: 1700 mL / OUT: 1000 mL / NET: 700 mL    PHYSICAL EXAM:  General: NAD, A/O x 3  ENT: MMM, no tonsilar exudate  Neck: Supple, No JVD  Lungs: Clear to auscultation bilaterally, no wheezes. Good air entry bilaterally   Cardio: RRR, S1/S2, No murmurs  Abdomen: Soft, non-tender. +BS. Midline surgical scar covered with bandage  Extremities: No calf tenderness, No pitting edema  Skin: Rash over arms improved, belly still present    LABS:                        11.8   10.84 )-----------( 523      ( 08 Dec 2020 07:00 )             35.1       12-08    135  |  100  |  5   ----------------------------<  119  3.7   |  25  |  1.03    Ca    8.9      08 Dec 2020 07:00  Mg     2.2     12-07    TPro  6.4  /  Alb  2.8  /  TBili  0.5  /  DBili  x   /  AST  22  /  ALT  49  /  AlkPhos  77  12-07     eGFR if Non African American: 86 mL/min/1.73M2 (12-08-20 @ 07:00)  eGFR if : 99 mL/min/1.73M2 (12-08-20 @ 07:00)    RADIOLOGY & ADDITIONAL TESTS: EKG, CXR    Care Discussed with Consultants/Other Providers: Yes

## 2020-12-09 ENCOUNTER — TRANSCRIPTION ENCOUNTER (OUTPATIENT)
Age: 50
End: 2020-12-09

## 2020-12-09 VITALS
OXYGEN SATURATION: 96 % | RESPIRATION RATE: 16 BRPM | HEART RATE: 85 BPM | DIASTOLIC BLOOD PRESSURE: 80 MMHG | SYSTOLIC BLOOD PRESSURE: 123 MMHG | TEMPERATURE: 97 F

## 2020-12-09 LAB
ANION GAP SERPL CALC-SCNC: 11 MMOL/L — SIGNIFICANT CHANGE UP (ref 5–17)
BUN SERPL-MCNC: 6 MG/DL — LOW (ref 7–23)
CALCIUM SERPL-MCNC: 9 MG/DL — SIGNIFICANT CHANGE UP (ref 8.4–10.5)
CHLORIDE SERPL-SCNC: 104 MMOL/L — SIGNIFICANT CHANGE UP (ref 96–108)
CO2 SERPL-SCNC: 26 MMOL/L — SIGNIFICANT CHANGE UP (ref 22–31)
CREAT SERPL-MCNC: 1.05 MG/DL — SIGNIFICANT CHANGE UP (ref 0.5–1.3)
GLUCOSE SERPL-MCNC: 151 MG/DL — HIGH (ref 70–99)
HCT VFR BLD CALC: 34 % — LOW (ref 39–50)
HGB BLD-MCNC: 11.2 G/DL — LOW (ref 13–17)
MCHC RBC-ENTMCNC: 30.4 PG — SIGNIFICANT CHANGE UP (ref 27–34)
MCHC RBC-ENTMCNC: 32.9 GM/DL — SIGNIFICANT CHANGE UP (ref 32–36)
MCV RBC AUTO: 92.4 FL — SIGNIFICANT CHANGE UP (ref 80–100)
NRBC # BLD: 0 /100 WBCS — SIGNIFICANT CHANGE UP (ref 0–0)
PLATELET # BLD AUTO: 533 K/UL — HIGH (ref 150–400)
POTASSIUM SERPL-MCNC: 4.1 MMOL/L — SIGNIFICANT CHANGE UP (ref 3.5–5.3)
POTASSIUM SERPL-SCNC: 4.1 MMOL/L — SIGNIFICANT CHANGE UP (ref 3.5–5.3)
RBC # BLD: 3.68 M/UL — LOW (ref 4.2–5.8)
RBC # FLD: 12.4 % — SIGNIFICANT CHANGE UP (ref 10.3–14.5)
SODIUM SERPL-SCNC: 141 MMOL/L — SIGNIFICANT CHANGE UP (ref 135–145)
WBC # BLD: 10.49 K/UL — SIGNIFICANT CHANGE UP (ref 3.8–10.5)
WBC # FLD AUTO: 10.49 K/UL — SIGNIFICANT CHANGE UP (ref 3.8–10.5)

## 2020-12-09 PROCEDURE — 86850 RBC ANTIBODY SCREEN: CPT

## 2020-12-09 PROCEDURE — 83690 ASSAY OF LIPASE: CPT

## 2020-12-09 PROCEDURE — 85610 PROTHROMBIN TIME: CPT

## 2020-12-09 PROCEDURE — 83735 ASSAY OF MAGNESIUM: CPT

## 2020-12-09 PROCEDURE — 99285 EMERGENCY DEPT VISIT HI MDM: CPT | Mod: 25

## 2020-12-09 PROCEDURE — 85027 COMPLETE CBC AUTOMATED: CPT

## 2020-12-09 PROCEDURE — 82962 GLUCOSE BLOOD TEST: CPT

## 2020-12-09 PROCEDURE — 83605 ASSAY OF LACTIC ACID: CPT

## 2020-12-09 PROCEDURE — 86900 BLOOD TYPING SEROLOGIC ABO: CPT

## 2020-12-09 PROCEDURE — 85730 THROMBOPLASTIN TIME PARTIAL: CPT

## 2020-12-09 PROCEDURE — 87070 CULTURE OTHR SPECIMN AEROBIC: CPT

## 2020-12-09 PROCEDURE — 80053 COMPREHEN METABOLIC PANEL: CPT

## 2020-12-09 PROCEDURE — 86769 SARS-COV-2 COVID-19 ANTIBODY: CPT

## 2020-12-09 PROCEDURE — 93005 ELECTROCARDIOGRAM TRACING: CPT

## 2020-12-09 PROCEDURE — 96375 TX/PRO/DX INJ NEW DRUG ADDON: CPT

## 2020-12-09 PROCEDURE — 36415 COLL VENOUS BLD VENIPUNCTURE: CPT

## 2020-12-09 PROCEDURE — 74177 CT ABD & PELVIS W/CONTRAST: CPT

## 2020-12-09 PROCEDURE — 87075 CULTR BACTERIA EXCEPT BLOOD: CPT

## 2020-12-09 PROCEDURE — 86901 BLOOD TYPING SEROLOGIC RH(D): CPT

## 2020-12-09 PROCEDURE — 74018 RADEX ABDOMEN 1 VIEW: CPT

## 2020-12-09 PROCEDURE — 85025 COMPLETE CBC W/AUTO DIFF WBC: CPT

## 2020-12-09 PROCEDURE — 87635 SARS-COV-2 COVID-19 AMP PRB: CPT

## 2020-12-09 PROCEDURE — 80048 BASIC METABOLIC PNL TOTAL CA: CPT

## 2020-12-09 PROCEDURE — 88307 TISSUE EXAM BY PATHOLOGIST: CPT

## 2020-12-09 PROCEDURE — 80076 HEPATIC FUNCTION PANEL: CPT

## 2020-12-09 PROCEDURE — 99239 HOSP IP/OBS DSCHRG MGMT >30: CPT

## 2020-12-09 PROCEDURE — 84100 ASSAY OF PHOSPHORUS: CPT

## 2020-12-09 PROCEDURE — 96374 THER/PROPH/DIAG INJ IV PUSH: CPT

## 2020-12-09 PROCEDURE — 71045 X-RAY EXAM CHEST 1 VIEW: CPT

## 2020-12-09 PROCEDURE — C1889: CPT

## 2020-12-09 PROCEDURE — 87205 SMEAR GRAM STAIN: CPT

## 2020-12-09 RX ORDER — HYDROXYZINE HCL 10 MG
1 TABLET ORAL
Qty: 9 | Refills: 0
Start: 2020-12-09 | End: 2020-12-11

## 2020-12-09 RX ORDER — OXYCODONE HYDROCHLORIDE 5 MG/1
1 TABLET ORAL
Qty: 20 | Refills: 0
Start: 2020-12-09 | End: 2020-12-13

## 2020-12-09 RX ORDER — LIDOCAINE 4 G/100G
1 CREAM TOPICAL
Qty: 35.44 | Refills: 0
Start: 2020-12-09 | End: 2020-12-13

## 2020-12-09 RX ORDER — ACETAMINOPHEN 500 MG
2 TABLET ORAL
Qty: 0 | Refills: 0 | DISCHARGE
Start: 2020-12-09

## 2020-12-09 RX ADMIN — OXYCODONE HYDROCHLORIDE 5 MILLIGRAM(S): 5 TABLET ORAL at 09:57

## 2020-12-09 RX ADMIN — PIPERACILLIN AND TAZOBACTAM 25 GRAM(S): 4; .5 INJECTION, POWDER, LYOPHILIZED, FOR SOLUTION INTRAVENOUS at 05:47

## 2020-12-09 RX ADMIN — Medication 25 MILLIGRAM(S): at 05:47

## 2020-12-09 RX ADMIN — Medication 1 APPLICATION(S): at 05:36

## 2020-12-09 RX ADMIN — OXYCODONE HYDROCHLORIDE 5 MILLIGRAM(S): 5 TABLET ORAL at 11:29

## 2020-12-09 NOTE — DISCHARGE NOTE PROVIDER - NSDCFUADDINST_GEN_ALL_CORE_FT
May shower over incision daily   low fiber diet   Drink plenty of fluids    no driving until cleared by Dr Cordero and not if taking   narcotics for pain.  Take oxycodone only for severe pain   alternate with tylenol 650 mg po every 6 hrs as needed for mild pain  May also take ibuprofen 400 mg po every  8 hrs   to avoid taking oxycodone .   Take hydroxyzine for itching as directed.     apply the lidicaine gel and the triamcinolone as directed .

## 2020-12-09 NOTE — DISCHARGE NOTE NURSING/CASE MANAGEMENT/SOCIAL WORK - PATIENT PORTAL LINK FT
You can access the FollowMyHealth Patient Portal offered by [facility] by registering at the following website: [email or web address]. By joining [facility]’s FollowMyHealth portal, you will also be able to view your health information using other applications (apps) compatible with our system.

## 2020-12-09 NOTE — DISCHARGE NOTE PROVIDER - NSDCMRMEDTOKEN_GEN_ALL_CORE_FT
acetaminophen 325 mg oral tablet: 2 tab(s) orally every 6 hours, As needed, Temp greater or equal to 38C (100.4F), Mild Pain (1 - 3)  hydrOXYzine hydrochloride 25 mg oral tablet: 1 tab(s) orally 3 times a day MDD:3  lidocaine 5% topical ointment: 1 application topically every 6 hours, As needed, rash/itching MDD:4  oxyCODONE 5 mg oral tablet: 1 tab(s) orally every 6 hours MDD:4  triamcinolone 0.1% topical cream: 1 application topically 2 times a day MDD:2

## 2020-12-09 NOTE — PROGRESS NOTE ADULT - SUBJECTIVE AND OBJECTIVE BOX
Patient is a 50y old  Male who presents with a chief complaint of abd pain, SBO (08 Dec 2020 11:12)    Patient seen and examined at bedside. No acute overnight events. Reports improvement of rash with creams. Pain controlled with oxycodone.    ALLERGIES:  No Known Allergies    MEDICATIONS  (STANDING):  BACItracin   Ointment 1 Application(s) Topical daily  enoxaparin Injectable 40 milliGRAM(s) SubCutaneous daily  hydrOXYzine hydrochloride 25 milliGRAM(s) Oral three times a day  lactated ringers. 1000 milliLiter(s) (50 mL/Hr) IV Continuous <Continuous>  piperacillin/tazobactam IVPB.. 3.375 Gram(s) IV Intermittent every 8 hours  triamcinolone 0.1% Cream 1 Application(s) Topical two times a day    MEDICATIONS  (PRN):  acetaminophen   Tablet .. 650 milliGRAM(s) Oral every 6 hours PRN Temp greater or equal to 38C (100.4F), Mild Pain (1 - 3)  diphenhydrAMINE 25 milliGRAM(s) Oral every 4 hours PRN Rash and/or Itching  lidocaine 5% Ointment 1 Application(s) Topical every 6 hours PRN rash/itching  ondansetron Injectable 4 milliGRAM(s) IV Push every 6 hours PRN Nausea and/or Vomiting  oxyCODONE    IR 10 milliGRAM(s) Oral every 4 hours PRN Severe Pain (7 - 10)  oxyCODONE    IR 5 milliGRAM(s) Oral every 4 hours PRN Moderate Pain (4 - 6)    Vital Signs Last 24 Hrs  T(F): 97.3 (09 Dec 2020 08:29), Max: 98 (09 Dec 2020 04:44)  HR: 85 (09 Dec 2020 08:29) (66 - 95)  BP: 123/80 (09 Dec 2020 08:29) (111/80 - 126/82)  RR: 16 (09 Dec 2020 08:29) (15 - 16)  SpO2: 96% (09 Dec 2020 08:29) (93% - 98%)  I&O's Summary    08 Dec 2020 07:01  -  09 Dec 2020 07:00  --------------------------------------------------------  IN: 1225 mL / OUT: 0 mL / NET: 1225 mL    PHYSICAL EXAM:  General: NAD, A/O x 3  ENT: MMM, no tonsilar exudate  Neck: Supple, No JVD  Lungs: Clear to auscultation bilaterally, no wheezes. Good air entry bilaterally   Cardio: RRR, S1/S2, No murmurs  Abdomen: Soft, Nontender, midline surgical incision well healing. +BS  Extremities: No calf tenderness, No pitting edema  Skin: rash improving    LABS:                        11.2   10.49 )-----------( 533      ( 09 Dec 2020 07:00 )             34.0       12-09    141  |  104  |  6   ----------------------------<  151  4.1   |  26  |  1.05    Ca    9.0      09 Dec 2020 07:00  Mg     2.2     12-07    TPro  6.4  /  Alb  2.8  /  TBili  0.5  /  DBili  x   /  AST  22  /  ALT  49  /  AlkPhos  77  12-07     eGFR if Non African American: 84 mL/min/1.73M2 (12-09-20 @ 07:00)  eGFR if : 97 mL/min/1.73M2 (12-09-20 @ 07:00)    RADIOLOGY & ADDITIONAL TESTS: EKG, CXR    Care Discussed with Consultants/Other Providers: Yes

## 2020-12-09 NOTE — DISCHARGE NOTE PROVIDER - NSDCCPTREATMENT_GEN_ALL_CORE_FT
PRINCIPAL PROCEDURE  Procedure: Laparotomy  Findings and Treatment: lysis of adhesions, SB resection for SB obstruction

## 2020-12-09 NOTE — PROGRESS NOTE ADULT - ASSESSMENT
50M hx of abdominal surgery at 100 days ago (unclear what type), hx of SBO s/p ASHLEY 1991, hx of SBO in 4/2018 presented with abd pain, noted to have severe sepsis secondary to SBO with ischemic bowel s/p ex lap and small bowel resection    #Sepsis 2/2 ischemic bowel - resolved  #SBO  -S/p small bowel resection POD 10  -Follow up with surgery recommendations  -Low fiber diet per surgery  -IVH  -Pain control with oxycodone  -Monitor vitals/wbc curve  -DC Zosyn per surgery    # Rash - improving  - Benadryl prn. will add atarax and lidocaine cream  - Triamcinolone cream    #DVT/GI proph  Lovenox  Protonix    DC home today 50M hx of abdominal surgery at 100 days ago (unclear what type), hx of SBO s/p ASHLEY 1991, hx of SBO in 4/2018 presented with abd pain, noted to have severe sepsis secondary to SBO with ischemic bowel s/p ex lap and small bowel resection    #Sepsis 2/2 acute ischemic bowel - resolved  #SBO  -S/p small bowel resection POD 10  -Follow up with surgery recommendations  -Low fiber diet per surgery  -IVH  -Pain control with oxycodone  -Monitor vitals/wbc curve  -DC Zosyn per surgery    # Rash - improving  - Benadryl prn. will add atarax and lidocaine cream  - Triamcinolone cream    #DVT/GI proph  Lovenox  Protonix    DC home today

## 2020-12-09 NOTE — DISCHARGE NOTE PROVIDER - NSDCCPCAREPLAN_GEN_ALL_CORE_FT
PRINCIPAL DISCHARGE DIAGNOSIS  Diagnosis: SBO (small bowel obstruction)  Assessment and Plan of Treatment: for Exploratory Laparotomy

## 2020-12-09 NOTE — DISCHARGE NOTE PROVIDER - HOSPITAL COURSE
HPI:  50M hx of abdominal surgery at 100 days ago (unclear what type), hx of SBO s/p ASHLEY 1991, hx of SBO in 4/2018 resolved with conservative tmt pw abd pain. Abd pain started moderately around 5PM, was able to tolerate dinner, had self treated with PeptoBismol. Woke up around 1200 with worsening sxs and vomiting x 1,  self treated with Faby [place] with no benefit and came to ED. In ED, afebrile hemodynamically stable. WBC:10.9 AST:70. CTAP with high grade SBO.  (29 Nov 2020 05:31)    Patient was taken to the OR and underwent several hours of lysis of adhesions and SB resection.   He has had an uneventful post op course but has required pain management and slow progression of his diet.   He is now  ambulating, voiding, tolerating a regular diet .  His surgical pain is being managed with Oxycodone and tylenol .    He was seen by Dr Cordero this am and he will be discharged to home this afternoon .    He will follow up with Dr Cordero in one week.   He has been given post op instructions. HPI:  50M hx of abdominal surgery at 100 days ago (unclear what type), hx of SBO s/p ASHLEY 1991, hx of SBO in 4/2018 resolved with conservative tmt pw abd pain. Abd pain started moderately around 5PM, was able to tolerate dinner, had self treated with PeptoBismol. Woke up around 1200 with worsening sxs and vomiting x 1,  self treated with Faby [place] with no benefit and came to ED. In ED, afebrile hemodynamically stable. WBC:10.9 AST:70. CTAP with high grade SBO.  (29 Nov 2020 05:31)    Patient was taken to the OR and underwent several hours of lysis of adhesions and SB resection.   He has had an uneventful post op course but has required pain management and slow progression of his diet.   He is now  ambulating, voiding, tolerating a regular diet .  His surgical pain is being managed with Oxycodone and tylenol .    He was seen by Dr Cordero this am and he will be discharged to home this afternoon .    He will follow up with Dr Cordero in one week.   He has been given post op instructions.       I, the attending physician, was physically present for the key portions of the evaluation and management (E/M) service provided. The total amount of time spent reviewing the hospital course, laboratory values, imaging findings, assessing/counseling the patient, discussing with consultant physicians, social work, nursing staff was 38 minutes.

## 2020-12-09 NOTE — DISCHARGE NOTE PROVIDER - CARE PROVIDER_API CALL
Bruno Cordero  COLON/RECTAL SURGERY  [address] [facility], [address]  [place], [place] [id number]  Phone: [phone]  Fax: [phone]  Scheduled Appointment: 12/16/2020

## 2020-12-09 NOTE — PROGRESS NOTE ADULT - REASON FOR ADMISSION
abd pain, SBO

## 2020-12-16 ENCOUNTER — APPOINTMENT (OUTPATIENT)
Dept: SURGERY | Facility: CLINIC | Age: 50
End: 2020-12-16
Payer: COMMERCIAL

## 2020-12-16 VITALS — BODY MASS INDEX: 21.08 KG/M2 | WEIGHT: 166 LBS | HEIGHT: 74.5 IN

## 2020-12-16 PROCEDURE — 99024 POSTOP FOLLOW-UP VISIT: CPT

## 2020-12-17 NOTE — PHYSICAL EXAM
[Abdominal Masses] : No abdominal masses [Abdomen Tenderness] : ~T ~M No abdominal tenderness [de-identified] : wound healing well

## 2021-01-06 ENCOUNTER — APPOINTMENT (OUTPATIENT)
Dept: SURGERY | Facility: CLINIC | Age: 51
End: 2021-01-06
Payer: COMMERCIAL

## 2021-01-06 DIAGNOSIS — Z80.0 FAMILY HISTORY OF MALIGNANT NEOPLASM OF DIGESTIVE ORGANS: ICD-10-CM

## 2021-01-06 DIAGNOSIS — Z82.3 FAMILY HISTORY OF STROKE: ICD-10-CM

## 2021-01-06 DIAGNOSIS — K56.50 INTESTINAL ADHESIONS [BANDS], UNSPECIFIED AS TO PARTIAL VERSUS COMPLETE OBSTRUCTION: ICD-10-CM

## 2021-01-06 PROCEDURE — 99024 POSTOP FOLLOW-UP VISIT: CPT

## 2021-01-07 PROBLEM — K56.50 SMALL BOWEL OBSTRUCTION DUE TO ADHESIONS: Status: ACTIVE | Noted: 2020-12-17

## 2021-01-07 NOTE — PHYSICAL EXAM
[Abdominal Masses] : No abdominal masses [Abdomen Tenderness] : ~T ~M No abdominal tenderness [de-identified] : wound healing well

## 2021-10-24 ENCOUNTER — EMERGENCY (EMERGENCY)
Facility: HOSPITAL | Age: 51
LOS: 1 days | Discharge: HOME | End: 2021-10-24
Attending: EMERGENCY MEDICINE | Admitting: EMERGENCY MEDICINE
Payer: COMMERCIAL

## 2021-10-24 VITALS
HEIGHT: 68 IN | RESPIRATION RATE: 18 BRPM | DIASTOLIC BLOOD PRESSURE: 66 MMHG | WEIGHT: 175.05 LBS | HEART RATE: 49 BPM | TEMPERATURE: 97 F | SYSTOLIC BLOOD PRESSURE: 108 MMHG | OXYGEN SATURATION: 97 %

## 2021-10-24 DIAGNOSIS — K56.609 UNSPECIFIED INTESTINAL OBSTRUCTION, UNSPECIFIED AS TO PARTIAL VERSUS COMPLETE OBSTRUCTION: Chronic | ICD-10-CM

## 2021-10-24 DIAGNOSIS — Z98.890 OTHER SPECIFIED POSTPROCEDURAL STATES: Chronic | ICD-10-CM

## 2021-10-24 LAB
ANION GAP SERPL CALC-SCNC: 9 MMOL/L — SIGNIFICANT CHANGE UP (ref 5–17)
APPEARANCE UR: CLEAR — SIGNIFICANT CHANGE UP
BACTERIA # UR AUTO: NEGATIVE /HPF — SIGNIFICANT CHANGE UP
BASOPHILS # BLD AUTO: 0.03 K/UL — SIGNIFICANT CHANGE UP (ref 0–0.2)
BASOPHILS NFR BLD AUTO: 0.4 % — SIGNIFICANT CHANGE UP (ref 0–2)
BILIRUB UR-MCNC: NEGATIVE — SIGNIFICANT CHANGE UP
BUN SERPL-MCNC: 12 MG/DL — SIGNIFICANT CHANGE UP (ref 7–23)
CALCIUM SERPL-MCNC: 8.3 MG/DL — LOW (ref 8.4–10.5)
CHLORIDE SERPL-SCNC: 107 MMOL/L — SIGNIFICANT CHANGE UP (ref 96–108)
CO2 SERPL-SCNC: 26 MMOL/L — SIGNIFICANT CHANGE UP (ref 22–31)
COLOR SPEC: YELLOW — SIGNIFICANT CHANGE UP
COMMENT - URINE: SIGNIFICANT CHANGE UP
CREAT SERPL-MCNC: 1.04 MG/DL — SIGNIFICANT CHANGE UP (ref 0.5–1.3)
DIFF PNL FLD: ABNORMAL
EOSINOPHIL # BLD AUTO: 0.06 K/UL — SIGNIFICANT CHANGE UP (ref 0–0.5)
EOSINOPHIL NFR BLD AUTO: 0.9 % — SIGNIFICANT CHANGE UP (ref 0–6)
EPI CELLS # UR: SIGNIFICANT CHANGE UP
GLUCOSE SERPL-MCNC: 133 MG/DL — HIGH (ref 70–99)
GLUCOSE UR QL: NEGATIVE — SIGNIFICANT CHANGE UP
HCT VFR BLD CALC: 42.3 % — SIGNIFICANT CHANGE UP (ref 39–50)
HGB BLD-MCNC: 14.9 G/DL — SIGNIFICANT CHANGE UP (ref 13–17)
IMM GRANULOCYTES NFR BLD AUTO: 0.6 % — SIGNIFICANT CHANGE UP (ref 0–1.5)
KETONES UR-MCNC: NEGATIVE — SIGNIFICANT CHANGE UP
LEUKOCYTE ESTERASE UR-ACNC: NEGATIVE — SIGNIFICANT CHANGE UP
LYMPHOCYTES # BLD AUTO: 2.24 K/UL — SIGNIFICANT CHANGE UP (ref 1–3.3)
LYMPHOCYTES # BLD AUTO: 31.8 % — SIGNIFICANT CHANGE UP (ref 13–44)
MCHC RBC-ENTMCNC: 32.2 PG — SIGNIFICANT CHANGE UP (ref 27–34)
MCHC RBC-ENTMCNC: 35.2 GM/DL — SIGNIFICANT CHANGE UP (ref 32–36)
MCV RBC AUTO: 91.4 FL — SIGNIFICANT CHANGE UP (ref 80–100)
MONOCYTES # BLD AUTO: 0.49 K/UL — SIGNIFICANT CHANGE UP (ref 0–0.9)
MONOCYTES NFR BLD AUTO: 7 % — SIGNIFICANT CHANGE UP (ref 2–14)
NEUTROPHILS # BLD AUTO: 4.19 K/UL — SIGNIFICANT CHANGE UP (ref 1.8–7.4)
NEUTROPHILS NFR BLD AUTO: 59.3 % — SIGNIFICANT CHANGE UP (ref 43–77)
NITRITE UR-MCNC: NEGATIVE — SIGNIFICANT CHANGE UP
NRBC # BLD: 0 /100 WBCS — SIGNIFICANT CHANGE UP (ref 0–0)
PH UR: 5 — SIGNIFICANT CHANGE UP (ref 5–8)
PLATELET # BLD AUTO: 281 K/UL — SIGNIFICANT CHANGE UP (ref 150–400)
POTASSIUM SERPL-MCNC: 3.9 MMOL/L — SIGNIFICANT CHANGE UP (ref 3.5–5.3)
POTASSIUM SERPL-SCNC: 3.9 MMOL/L — SIGNIFICANT CHANGE UP (ref 3.5–5.3)
PROT UR-MCNC: NEGATIVE — SIGNIFICANT CHANGE UP
RBC # BLD: 4.63 M/UL — SIGNIFICANT CHANGE UP (ref 4.2–5.8)
RBC # FLD: 12.4 % — SIGNIFICANT CHANGE UP (ref 10.3–14.5)
RBC CASTS # UR COMP ASSIST: SIGNIFICANT CHANGE UP /HPF (ref 0–4)
SODIUM SERPL-SCNC: 142 MMOL/L — SIGNIFICANT CHANGE UP (ref 135–145)
SP GR SPEC: 1.02 — SIGNIFICANT CHANGE UP (ref 1.01–1.02)
UROBILINOGEN FLD QL: NEGATIVE — SIGNIFICANT CHANGE UP
WBC # BLD: 7.05 K/UL — SIGNIFICANT CHANGE UP (ref 3.8–10.5)
WBC # FLD AUTO: 7.05 K/UL — SIGNIFICANT CHANGE UP (ref 3.8–10.5)
WBC UR QL: SIGNIFICANT CHANGE UP /HPF (ref 0–5)

## 2021-10-24 PROCEDURE — 96374 THER/PROPH/DIAG INJ IV PUSH: CPT

## 2021-10-24 PROCEDURE — 81001 URINALYSIS AUTO W/SCOPE: CPT

## 2021-10-24 PROCEDURE — 99285 EMERGENCY DEPT VISIT HI MDM: CPT

## 2021-10-24 PROCEDURE — 74176 CT ABD & PELVIS W/O CONTRAST: CPT | Mod: 26,ME

## 2021-10-24 PROCEDURE — G1004: CPT

## 2021-10-24 PROCEDURE — 80048 BASIC METABOLIC PNL TOTAL CA: CPT

## 2021-10-24 PROCEDURE — 96375 TX/PRO/DX INJ NEW DRUG ADDON: CPT

## 2021-10-24 PROCEDURE — 36415 COLL VENOUS BLD VENIPUNCTURE: CPT

## 2021-10-24 PROCEDURE — 74176 CT ABD & PELVIS W/O CONTRAST: CPT | Mod: ME

## 2021-10-24 PROCEDURE — 85025 COMPLETE CBC W/AUTO DIFF WBC: CPT

## 2021-10-24 PROCEDURE — 99284 EMERGENCY DEPT VISIT MOD MDM: CPT | Mod: 25

## 2021-10-24 RX ORDER — OXYCODONE HYDROCHLORIDE 5 MG/1
1 TABLET ORAL
Qty: 12 | Refills: 0
Start: 2021-10-24 | End: 2021-10-26

## 2021-10-24 RX ORDER — MOXIFLOXACIN HYDROCHLORIDE TABLETS, 400 MG 400 MG/1
1 TABLET, FILM COATED ORAL
Qty: 14 | Refills: 0
Start: 2021-10-24 | End: 2021-10-30

## 2021-10-24 RX ORDER — SODIUM CHLORIDE 9 MG/ML
1000 INJECTION INTRAMUSCULAR; INTRAVENOUS; SUBCUTANEOUS ONCE
Refills: 0 | Status: COMPLETED | OUTPATIENT
Start: 2021-10-24 | End: 2021-10-24

## 2021-10-24 RX ORDER — ONDANSETRON 8 MG/1
4 TABLET, FILM COATED ORAL ONCE
Refills: 0 | Status: COMPLETED | OUTPATIENT
Start: 2021-10-24 | End: 2021-10-24

## 2021-10-24 RX ORDER — TAMSULOSIN HYDROCHLORIDE 0.4 MG/1
1 CAPSULE ORAL
Qty: 3 | Refills: 0
Start: 2021-10-24 | End: 2021-10-26

## 2021-10-24 RX ORDER — KETOROLAC TROMETHAMINE 30 MG/ML
15 SYRINGE (ML) INJECTION ONCE
Refills: 0 | Status: DISCONTINUED | OUTPATIENT
Start: 2021-10-24 | End: 2021-10-24

## 2021-10-24 RX ORDER — ACETAMINOPHEN 500 MG
650 TABLET ORAL ONCE
Refills: 0 | Status: COMPLETED | OUTPATIENT
Start: 2021-10-24 | End: 2021-10-24

## 2021-10-24 RX ADMIN — Medication 650 MILLIGRAM(S): at 11:11

## 2021-10-24 RX ADMIN — Medication 15 MILLIGRAM(S): at 10:25

## 2021-10-24 RX ADMIN — SODIUM CHLORIDE 2000 MILLILITER(S): 9 INJECTION INTRAMUSCULAR; INTRAVENOUS; SUBCUTANEOUS at 10:25

## 2021-10-24 RX ADMIN — Medication 15 MILLIGRAM(S): at 10:40

## 2021-10-24 RX ADMIN — SODIUM CHLORIDE 1000 MILLILITER(S): 9 INJECTION INTRAMUSCULAR; INTRAVENOUS; SUBCUTANEOUS at 11:11

## 2021-10-24 RX ADMIN — ONDANSETRON 4 MILLIGRAM(S): 8 TABLET, FILM COATED ORAL at 10:25

## 2021-10-24 NOTE — ED PROVIDER NOTE - NSFOLLOWUPINSTRUCTIONS_ED_ALL_ED_FT
Kidney Stones    Kidney stones are rock-like masses that form inside of the kidneys. Kidneys are organs that make pee (urine). A kidney stone may move into other parts of the urinary tract, including:  •The tubes that connect the kidneys to the bladder (ureters).      •The bladder.      •The tube that carries urine out of the body (urethra).      Kidney stones can cause very bad pain and can block the flow of pee. The stone usually leaves your body (passes) through your pee. You may need to have a doctor take out the stone.      What are the causes?  Kidney stones may be caused by:  •A condition in which certain glands make too much parathyroid hormone (primary hyperparathyroidism).      •A buildup of a type of crystals in the bladder made of a chemical called uric acid. The body makes uric acid when you eat certain foods.      •Narrowing (stricture) of one or both of the ureters.      •A kidney blockage that you were born with.      •Past surgery on the kidney or the ureters, such as gastric bypass surgery.        What increases the risk?  You are more likely to develop this condition if:  •You have had a kidney stone in the past.      •You have a family history of kidney stones.      •You do not drink enough water.      •You eat a diet that is high in protein, salt (sodium), or sugar.      •You are overweight or very overweight (obese).        What are the signs or symptoms?  Symptoms of a kidney stone may include:  •Pain in the side of the belly, right below the ribs (flank pain). Pain usually spreads (radiates) to the groin.      •Needing to pee often or right away (urgently).      •Pain when going pee (urinating).      •Blood in your pee (hematuria).      •Feeling like you may vomit (nauseous).      •Vomiting.      •Fever and chills.        How is this treated?  Treatment depends on the size, location, and makeup of the kidney stones. The stones will often pass out of the body through peeing. You may need to:  •Drink more fluid to help pass the stone. In some cases, you may be given fluids through an IV tube put into one of your veins at the hospital.       •Take medicine for pain.       •Make changes in your diet to help keep kidney stones from coming back.    Sometimes, medical procedures are needed to remove a kidney stone. This may involve:  •A procedure to break up kidney stones using a beam of light (laser) or shock waves.      •Surgery to remove the kidney stones.         Follow these instructions at home:    Medicines     •Take over-the-counter and prescription medicines only as told by your doctor.      •Ask your doctor if the medicine prescribed to you requires you to avoid driving or using heavy machinery.      Eating and drinking     •Drink enough fluid to keep your pee pale yellow. You may be told to drink at least 8–10 glasses of water each day. This will help you pass the stone.    •If told by your doctor, change your diet. This may include:  •Limiting how much salt you eat.      •Eating more fruits and vegetables.      •Limiting how much meat, poultry, fish, and eggs you eat.        •Follow instructions from your doctor about eating or drinking restrictions.      General instructions   •Collect pee samples as told by your doctor. You may need to collect a pee sample:  •24 hours after a stone comes out.      •8–12 weeks after a stone comes out, and every 6–12 months after that.        •Strain your pee every time you pee (urinate), for as long as told. Use the strainer that your doctor recommends.      • Do not throw out the stone. Keep it so that it can be tested by your doctor.      •Keep all follow-up visits as told by your doctor. This is important. You may need follow-up tests.        How is this prevented?  To prevent another kidney stone:  •Drink enough fluid to keep your pee pale yellow. This is the best way to prevent kidney stones.      •Eat healthy foods.      •Avoid certain foods as told by your doctor. You may be told to eat less protein.      •Stay at a healthy weight.        Where to find more information    •National Kidney Foundation (NKF): www.kidney.org      •[employer] (U[facility]): www.urologyhealth.org        Contact a doctor if:    •You have pain that gets worse or does not get better with medicine.        Get help right away if:    •You have a fever or chills.      •You get very bad pain.      •You get new pain in your belly (abdomen).      •You pass out (faint).      •You cannot pee.        Summary    •Kidney stones are rock-like masses that form inside of the kidneys.      •Kidney stones can cause very bad pain and can block the flow of pee.      •The stones will often pass out of the body through peeing.      •Drink enough fluid to keep your pee pale yellow.    Ibuprofen 600mg by mouth every 6 hours as needed for pain  Acetaminophen 650mg by mouth every 4 hours as needed for pain  Take prescriptions as prescribed  Use strainer to urinate and bring stone to urology.  Follow up with urology, Dr Torres in 1-3 days.  Return to ER if fever, vomiting, unable to take medication, not urinating, worsening pain or other symptoms.

## 2021-10-24 NOTE — ED PROVIDER NOTE - OBJECTIVE STATEMENT
Pt with 3 hour history sudden onset sharp left flank pain, associated with nausea, no emesis.  no fever, no chills. no urinary symptoms. no cp, no sob. no hx stones.

## 2021-10-24 NOTE — ED PROVIDER NOTE - PATIENT PORTAL LINK FT
Referred by: Ezra Reece digits)  Discussed desensitization with textures to scars     Skilled input: verbal instruction/cues and tactile instruction/cues    Writer verbally educated and received verbal consent for hand placement, positioning of patient, and techniques to be performed today from patient for hand placement and palpation for techniques as described above and how they are pertinent to the patient's plan of care.    Home Exercise Program: (*above indicates provided as part of home exercise program)  7/7/2020:  Wrist active range of motion - flexion, extension, radial and ulnar deviation  Forearm active range of motion - supination, pronation  Thumb active range of motion - radial and palmar abduction, opposition to digit 1  Flexor tendon glides  IP blocking  Splint - don at all times with exception of hygiene and HEP completion  7/20/2020   Size E tetra   Median nerve gliding - 5 times; 3 times a day            ASSESSMENT                                                                                                             Carpal tunnel scar is less sensitive today following debridement of sloughing tissue and scar mobilization. Radial wrist scar was more sensitive to touch. Patient needs encouragement to continue to progress her finger Active Range of Motion (AROM) as pain limits full motion. Initiated median nerve gliding today without difficulty.    Pain/symptoms after session: 8  Patient Education:   Results of above outlined education: Needs reinforcement     PLAN                                                                                                                             Suggestions for next session as indicated: Progress per plan of care  passive range of motion to wrist and digits  Thumb metacarpal phalange / IP blocking  Nerve glides - median and radial  Tendon glides   AROM wrist/thumb  Scar massage / desensitization  Light coordination activity  Trial ultrasound to scar?       Procedures and  total treatment time documented Time Entry flowsheet.     You can access the FollowMyHealth Patient Portal offered by [facility] by registering at the following website: [email or web address]. By joining [employer]’s FollowMyHealth portal, you will also be able to view your health information using other applications (apps) compatible with our system.

## 2021-10-24 NOTE — ED PROVIDER NOTE - PHYSICAL EXAMINATION
Gen:  alert, awake, no acute distress  Head:  atraumatic, normocephalic  HEENT: PERRLA, EOMI, normal nose, normal oropharynx, no tonsillar edema, erythema, or exudate  CV:  rrr, nl S1, S2, no m/r/g  Pulm:  lungs CTA b/l  Abd: s/nt/nd, +BS; healed surgical scar on abdomen  MSK:  moving all extremities, no back midline ttp, no stepoffs, +left cva TTP  Neuro:  grossly intact, no focal deficits  Skin:  clear, dry, intact  Psych: AOx3, normal affect, no apparent risk to self or others

## 2021-10-24 NOTE — ED ADULT TRIAGE NOTE - CHIEF COMPLAINT QUOTE
Patient presents to ED from home complaining of left lower side (not particularly flank or abdomen as per patient) pain since this morning. Patient denies PMH, does not take any medications on a daily basis.

## 2021-10-24 NOTE — ED PROVIDER NOTE - CARE PROVIDERS DIRECT ADDRESSES
[email or web address]

## 2021-10-24 NOTE — ED ADULT NURSE NOTE - NSIMPLEMENTINTERV_GEN_ALL_ED
Implemented All Universal Safety Interventions:  [place] to call system. Call bell, personal items and telephone within reach. Instruct patient to call for assistance. Room bathroom lighting operational. Non-slip footwear when patient is off stretcher. Physically safe environment: no spills, clutter or unnecessary equipment. Stretcher in lowest position, wheels locked, appropriate side rails in place.

## 2021-10-24 NOTE — ED PROVIDER NOTE - CLINICAL SUMMARY MEDICAL DECISION MAKING FREE TEXT BOX
Pt with 3 hour history sudden onset sharp left flank pain, associated with nausea, no emesis.  no fever, no chills. no urinary symptoms. no cp, no sob. no hx stones.    iv fluids, analgesia, cbc, bmp, ua, stone hunt, re-assess Pt with 3 hour history sudden onset sharp left flank pain, associated with nausea, no emesis.  no fever, no chills. no urinary symptoms. no cp, no sob. no hx stones.    iv fluids, analgesia, cbc, bmp, ua, stone hunt, re-assess    ot feeling better, stable for d/c, urology f/u

## 2021-10-24 NOTE — ED ADULT NURSE NOTE - OBJECTIVE STATEMENT
Patient presents to ED from home complaining of left lower side (not particularly flank or abdomen as per patient) pain since this morning. Patient denies PMH, does not take any medications on a daily basis.  Pt has left sided pain for a few hours.

## 2021-10-24 NOTE — ED ADULT NURSE NOTE - CAS DISCH ACCOMP BY
What Is The Reason For Today's Visit?: History of Non-Melanoma Skin Cancer How Many Skin Cancers Have You Had?: more than one Spouse

## 2021-10-24 NOTE — ED PROVIDER NOTE - CARE PROVIDER_API CALL
William Torres  UROLOGY  [address] [place] [address], [address]  [place], [place] [id number]  Phone: [phone]  Fax: [phone]  Follow Up Time:

## 2021-10-24 NOTE — ED PROVIDER NOTE - NS ED ROS FT
Except as otherwise indicated in HPI:  CONSTITUTIONAL: Neg  HEENT: neg  CV: neg  Resp: neg  GI: nausea, no abd pain  : Neg  MSK: +flank pain  SKIN: Neg  NEURO: Neg  PSYCHIATRIC: Neg  Heme/Onc: Neg

## 2021-11-01 ENCOUNTER — TRANSCRIPTION ENCOUNTER (OUTPATIENT)
Age: 51
End: 2021-11-01

## 2021-11-17 ENCOUNTER — OUTPATIENT (OUTPATIENT)
Dept: OUTPATIENT SERVICES | Facility: HOSPITAL | Age: 51
LOS: 1 days | End: 2021-11-17
Payer: COMMERCIAL

## 2021-11-17 ENCOUNTER — APPOINTMENT (OUTPATIENT)
Dept: RADIOLOGY | Facility: HOSPITAL | Age: 51
End: 2021-11-17
Payer: COMMERCIAL

## 2021-11-17 ENCOUNTER — APPOINTMENT (OUTPATIENT)
Dept: ULTRASOUND IMAGING | Facility: HOSPITAL | Age: 51
End: 2021-11-17
Payer: COMMERCIAL

## 2021-11-17 DIAGNOSIS — Z00.8 ENCOUNTER FOR OTHER GENERAL EXAMINATION: ICD-10-CM

## 2021-11-17 DIAGNOSIS — K56.609 UNSPECIFIED INTESTINAL OBSTRUCTION, UNSPECIFIED AS TO PARTIAL VERSUS COMPLETE OBSTRUCTION: Chronic | ICD-10-CM

## 2021-11-17 DIAGNOSIS — Z98.890 OTHER SPECIFIED POSTPROCEDURAL STATES: Chronic | ICD-10-CM

## 2021-11-17 PROCEDURE — 74018 RADEX ABDOMEN 1 VIEW: CPT

## 2021-11-17 PROCEDURE — 76775 US EXAM ABDO BACK WALL LIM: CPT | Mod: 26

## 2021-11-17 PROCEDURE — 76775 US EXAM ABDO BACK WALL LIM: CPT

## 2021-11-17 PROCEDURE — 74018 RADEX ABDOMEN 1 VIEW: CPT | Mod: 26

## 2022-01-06 ENCOUNTER — NON-APPOINTMENT (OUTPATIENT)
Age: 52
End: 2022-01-06

## 2022-01-06 ENCOUNTER — APPOINTMENT (OUTPATIENT)
Dept: ORTHOPEDIC SURGERY | Facility: CLINIC | Age: 52
End: 2022-01-06
Payer: COMMERCIAL

## 2022-01-06 DIAGNOSIS — S62.617A DISPLACED FRACTURE OF PROXIMAL PHALANX OF LEFT LITTLE FINGER, INITIAL ENCOUNTER FOR CLOSED FRACTURE: ICD-10-CM

## 2022-01-06 PROCEDURE — 99203 OFFICE O/P NEW LOW 30 MIN: CPT | Mod: 25

## 2022-01-06 PROCEDURE — 29125 APPL SHORT ARM SPLINT STATIC: CPT | Mod: LT

## 2022-01-06 PROCEDURE — 73140 X-RAY EXAM OF FINGER(S): CPT | Mod: F4

## 2022-01-06 NOTE — DISCUSSION/SUMMARY
[FreeTextEntry1] : The underlying pathophysiology was reviewed with the patient. XR films were reviewed with the patient. Discussed at length the nature of the patient’s condition. The left little finger symptoms appear secondary to a fracture of the proximal phalanx. \par \par At this time, the little finger was pia taped to the neighboring digit. He was placed into a well molded fiber glass splint. The digits were wrapped into forced flexion with Coban to promote flexion. He was instructed to change the tape daily. Additionally, he was strongly encouraged to actively and passively move the finger into both flexion and extension as to not risk the ligaments scarring down and therefore losing motion about the little finger. \par Motrin prn. \par \par All questions answered, understanding verbalized. Patient in agreement with plan of care. Follow up in 2  weeks for repeat xrays.

## 2022-01-06 NOTE — END OF VISIT
[FreeTextEntry3] : All medical record entries made by the Scribe were at my,  Dr. Francisco Javier Mccollum MD., direction and personally dictated by me on 01/06/2022. I have personally reviewed the chart and agree that the record accurately reflects my personal performance of the history, physical exam, assessment and plan.

## 2022-01-06 NOTE — PHYSICAL EXAM
[de-identified] : Patient is WDWN, alert, and in no acute distress. Breathing is unlabored. He is grossly oriented to person, place, and time.\par \par Left Hand (Little Finger):\par Tenderness proximally to the left little finger in the area of the fracture.\par Edema and bruising noted.\par Limitations of motion into flexion and extension due to injury and pain\par Sensation is normal to the digit. [de-identified] : AP, lateral and oblique views of the left little finger were obtained today and revealed a fracture at the base of the proximal phalanx.

## 2022-01-06 NOTE — HISTORY OF PRESENT ILLNESS
[Right] : right hand dominant [FreeTextEntry1] : Patient is a 51 year old male who presents with complains of left hand pain secondary to an injury sustained to the left little finger. He sustained the injury on 12/28/21 at [facility]. He states his [place] and he was sitting beside her when another snowboarding ran over his hand. He has difficulty with movement of the little finger and complains of pain with use and at rest. He presents today for treatment options.

## 2022-01-06 NOTE — ADDENDUM
[FreeTextEntry1] : I, Lisa Chu wrote this note acting as a scribe for Dr. Francisco Javier Mccollum on Jan 06, 2022.

## 2022-01-20 ENCOUNTER — APPOINTMENT (OUTPATIENT)
Dept: ORTHOPEDIC SURGERY | Facility: CLINIC | Age: 52
End: 2022-01-20
Payer: COMMERCIAL

## 2022-01-20 DIAGNOSIS — S62.617D DISPLACED FRACTURE OF PROXIMAL PHALANX OF LEFT LITTLE FINGER, SUBSEQUENT ENCOUNTER FOR FRACTURE WITH ROUTINE HEALING: ICD-10-CM

## 2022-01-20 PROCEDURE — 73140 X-RAY EXAM OF FINGER(S): CPT | Mod: F4

## 2022-01-20 PROCEDURE — 99213 OFFICE O/P EST LOW 20 MIN: CPT

## 2022-01-20 NOTE — PHYSICAL EXAM
[de-identified] : Patient is WDWN, alert, and in no acute distress. Breathing is unlabored. He is grossly oriented to person, place, and time.\par \par Left Hand (Little Finger):\par Tenderness proximally to the left little finger in the area of the fracture.\par Edema and bruising noted.\par Limitations of motion into flexion and extension due to injury and pain\par Sensation is normal to the digit.  [de-identified] : AP, lateral and oblique views of the left little finger were obtained today and revealed a fracture at the base of the proximal phalanx. Fracture is well aligned, in good position and is healing well.

## 2022-01-20 NOTE — END OF VISIT
[FreeTextEntry3] : All medical record entries made by the Scribe were at my,  Dr. Francisco Javier Mccollum MD., direction and personally dictated by me on 01/20/2022. I have personally reviewed the chart and agree that the record accurately reflects my personal performance of the history, physical exam, assessment and plan.

## 2022-01-20 NOTE — ADDENDUM
[FreeTextEntry1] : I, Lisa Chu wrote this note acting as a scribe for Dr. Francisco Javier Mccollum on Jan 20, 2022.

## 2022-01-20 NOTE — DISCUSSION/SUMMARY
[FreeTextEntry1] : The underlying pathophysiology was reviewed with the patient. XR films were reviewed with the patient. Discussed at length the nature of the patient’s condition. The left little finger symptoms appear secondary to a fracture of the proximal phalanx. \par \par He was again strongly encouraged to actively and passively move the finger into both flexion and extension as to not risk the ligaments scarring down and therefore losing motion about the little finger. \par He may soak the hand in warm water and Epsom salts.\par Deferred an RX for hand therapy in lieu of an at home program.\par \par All questions answered, understanding verbalized. Patient in agreement with plan of care. Follow up in 1 month for repeat xrays.

## 2022-01-20 NOTE — HISTORY OF PRESENT ILLNESS
[FreeTextEntry1] : WINIFRED HAYES is a 51 year old right hand dominant male. Patient is a 51 year old male who presents with complains of left hand pain secondary to an injury sustained to the left little finger. He sustained the injury on 12/28/21 at [facility]. He states his daughter was stopped on the side of the [place] and he was sitting beside her when another snowboarding ran over his hand. He has difficulty with movement of the little finger and complains of pain with use and at rest. He presented to the office on 1/6/22 at which time he was instructed on pia taping of the little finger. He returns on 1/20/22 for repeat xrays. He reports to have continued pain to the digit. His motion has improved however.

## 2022-02-22 ENCOUNTER — APPOINTMENT (OUTPATIENT)
Dept: ORTHOPEDIC SURGERY | Facility: CLINIC | Age: 52
End: 2022-02-22

## 2022-02-24 NOTE — DISCUSSION/SUMMARY
[FreeTextEntry1] : The underlying pathophysiology was reviewed with the patient. XR films were reviewed with the patient. Discussed at length the nature of the patient’s condition. The left little finger symptoms appear secondary to a fracture of the proximal phalanx. \par \par \par All questions answered, understanding verbalized. Patient in agreement with plan of care. Follow up in 1 month for repeat xrays.

## 2022-02-24 NOTE — HISTORY OF PRESENT ILLNESS
[FreeTextEntry1] : WINIFRED HAYSE is a 51 year old right hand dominant male. Patient is a 51 year old male who presents with complains of left hand pain secondary to an injury sustained to the left little finger. He sustained the injury on 12/28/21 at [facility]. He states his daughter was stopped on the side of the [place] and he was sitting beside her when another snowboarding ran over his hand. He has difficulty with movement of the little finger and complains of pain with use and at rest. He presented to the office on 1/6/22 at which time he was instructed on pia taping of the little finger. He returned on 1/20/22 for repeat xrays. He reported to have continued pain to the digit. His motion had improved however. He once again returns on 2/22/22 for repeat xrays. He notes to be

## 2022-02-24 NOTE — PHYSICAL EXAM
[de-identified] : Patient is WDWN, alert, and in no acute distress. Breathing is unlabored. He is grossly oriented to person, place, and time.\par \par Left Hand (Little Finger):\par Tenderness proximally to the left little finger in the area of the fracture.\par Edema and bruising noted.\par Limitations of motion into flexion and extension due to injury and pain\par Sensation is normal to the digit.  [de-identified] : AP, lateral and oblique views of the left little finger were obtained today and revealed a fracture at the base of the proximal phalanx. Fracture is well aligned, in good position and is healing well.

## 2022-02-24 NOTE — END OF VISIT
[FreeTextEntry3] : All medical record entries made by the Scribe were at my,  Dr. Francisco Javier Mccollum MD., direction and personally dictated by me on 02/22/2022. I have personally reviewed the chart and agree that the record accurately reflects my personal performance of the history, physical exam, assessment and plan.

## 2022-02-24 NOTE — ADDENDUM
[FreeTextEntry1] : I, Lisa Chu wrote this note acting as a scribe for Dr. Francisco Javier Mccollum on Feb 22, 2022.

## 2023-03-02 ENCOUNTER — NON-APPOINTMENT (OUTPATIENT)
Age: 53
End: 2023-03-02

## 2023-09-20 NOTE — ED ADULT TRIAGE NOTE - CHIEF COMPLAINT QUOTE
Pt c/o abdominal pain that began at 5pm yesterday, took Pepto-Bismol and willian-seltzer PTA with no relief. No

## 2023-10-26 NOTE — ED ADULT NURSE NOTE - [facility] ED NUR SYMPTOMS POS
Additional History: New or here for hypopigmented areas on chest arms and back, notes this has always been present but recently gotten worse in the past couple months
Bulb Janine-Pharynx Suction Only
PAIN

## 2024-12-02 NOTE — ED ADULT NURSE NOTE - TEMPLATE LIST FOR HEAD TO TOE ASSESSMENT
vomiting blood while at work tonight with generalized abdominal pains.  denies blood thinners
Abdominal Pain, N/V/D